# Patient Record
Sex: MALE | Race: WHITE | NOT HISPANIC OR LATINO | URBAN - METROPOLITAN AREA
[De-identification: names, ages, dates, MRNs, and addresses within clinical notes are randomized per-mention and may not be internally consistent; named-entity substitution may affect disease eponyms.]

---

## 2017-02-16 ENCOUNTER — HISTORICAL (OUTPATIENT)
Dept: ADMINISTRATIVE | Facility: HOSPITAL | Age: 62
End: 2017-02-16

## 2017-12-18 LAB — CRC RECOMMENDATION EXT: NORMAL

## 2019-03-07 ENCOUNTER — HISTORICAL (OUTPATIENT)
Dept: ADMINISTRATIVE | Facility: HOSPITAL | Age: 64
End: 2019-03-07

## 2019-12-20 ENCOUNTER — HISTORICAL (OUTPATIENT)
Dept: ADMINISTRATIVE | Facility: HOSPITAL | Age: 64
End: 2019-12-20

## 2020-05-21 ENCOUNTER — HISTORICAL (OUTPATIENT)
Dept: ADMINISTRATIVE | Facility: HOSPITAL | Age: 65
End: 2020-05-21

## 2020-05-25 ENCOUNTER — HISTORICAL (OUTPATIENT)
Dept: RADIOLOGY | Facility: HOSPITAL | Age: 65
End: 2020-05-25

## 2020-06-18 ENCOUNTER — HISTORICAL (OUTPATIENT)
Dept: ADMINISTRATIVE | Facility: HOSPITAL | Age: 65
End: 2020-06-18

## 2020-06-18 LAB
ABS NEUT (OLG): 6.87 X10(3)/MCL (ref 2.1–9.2)
ALBUMIN SERPL-MCNC: 2.9 GM/DL (ref 3.4–4.8)
ALBUMIN/GLOB SERPL: 0.9 RATIO (ref 1.1–2)
ALP SERPL-CCNC: 53 UNIT/L (ref 40–150)
ALT SERPL-CCNC: 10 UNIT/L (ref 0–55)
AST SERPL-CCNC: 16 UNIT/L (ref 5–34)
BASOPHILS # BLD AUTO: 0.06 X10(3)/MCL (ref 0–0.2)
BASOPHILS NFR BLD AUTO: 0.6 % (ref 0–0.9)
BILIRUB SERPL-MCNC: 0.8 MG/DL (ref 0.2–1.2)
BILIRUBIN DIRECT+TOT PNL SERPL-MCNC: 0.4 MG/DL (ref 0–0.5)
BILIRUBIN DIRECT+TOT PNL SERPL-MCNC: 0.4 MG/DL (ref 0–0.8)
BNP BLD-MCNC: 65.5 PG/ML
BUN SERPL-MCNC: 12.3 MG/DL (ref 8.4–25.7)
CALCIUM SERPL-MCNC: 8.7 MG/DL (ref 8.8–10)
CHLORIDE SERPL-SCNC: 104 MMOL/L (ref 98–107)
CHOLEST SERPL-MCNC: 103 MG/DL
CHOLEST/HDLC SERPL: 3 {RATIO} (ref 0–5)
CO2 SERPL-SCNC: 23 MMOL/L (ref 23–31)
CREAT SERPL-MCNC: 0.73 MG/DL (ref 0.72–1.25)
EOSINOPHIL # BLD AUTO: 0.19 X10(3)/MCL (ref 0–0.9)
EOSINOPHIL NFR BLD AUTO: 1.8 % (ref 0–6.5)
ERYTHROCYTE [DISTWIDTH] IN BLOOD BY AUTOMATED COUNT: 14.4 % (ref 11.5–17)
EST. AVERAGE GLUCOSE BLD GHB EST-MCNC: 111.2 MG/DL
GLOBULIN SER-MCNC: 3.2 GM/DL (ref 2.4–3.5)
GLUCOSE SERPL-MCNC: 99 MG/DL (ref 82–115)
HBA1C MFR BLD: 5.5 %
HCT VFR BLD AUTO: 33.9 % (ref 42–52)
HDLC SERPL-MCNC: 39 MG/DL (ref 40–60)
HGB BLD-MCNC: 11.4 GM/DL (ref 14–18)
IMM GRANULOCYTES # BLD AUTO: 0.03 10*3/UL (ref 0–0.02)
IMM GRANULOCYTES NFR BLD AUTO: 0.3 % (ref 0–0.43)
LDLC SERPL CALC-MCNC: 46 MG/DL (ref 50–140)
LYMPHOCYTES # BLD AUTO: 2.36 X10(3)/MCL (ref 0.6–4.6)
LYMPHOCYTES NFR BLD AUTO: 22.5 % (ref 16.2–38.3)
MCH RBC QN AUTO: 30.8 PG (ref 27–31)
MCHC RBC AUTO-ENTMCNC: 33.6 GM/DL (ref 33–36)
MCV RBC AUTO: 91.6 FL (ref 80–94)
MONOCYTES # BLD AUTO: 0.96 X10(3)/MCL (ref 0.1–1.3)
MONOCYTES NFR BLD AUTO: 9.2 % (ref 4.7–11.3)
NEUTROPHILS # BLD AUTO: 6.87 X10(3)/MCL (ref 2.1–9.2)
NEUTROPHILS NFR BLD AUTO: 65.6 % (ref 49.1–73.4)
NRBC BLD AUTO-RTO: 0 % (ref 0–0.2)
PLATELET # BLD AUTO: 216 X10(3)/MCL (ref 130–400)
PMV BLD AUTO: 10.6 FL (ref 7.4–10.4)
POTASSIUM SERPL-SCNC: 3.8 MMOL/L (ref 3.5–5.1)
PREALB SERPL-MCNC: 12.6 MG/DL (ref 16–42)
PROT SERPL-MCNC: 6.1 GM/DL (ref 5.8–7.6)
RBC # BLD AUTO: 3.7 X10(6)/MCL (ref 4.7–6.1)
SODIUM SERPL-SCNC: 139 MMOL/L (ref 136–145)
TRIGL SERPL-MCNC: 91 MG/DL (ref 0–150)
TSH SERPL-ACNC: 4.84 UIU/ML (ref 0.35–4.94)
VLDLC SERPL CALC-MCNC: 18 MG/DL
WBC # SPEC AUTO: 10.5 X10(3)/MCL (ref 4.5–11.5)

## 2020-06-22 LAB
ABS NEUT (OLG): 5.77 X10(3)/MCL (ref 2.1–9.2)
ALBUMIN SERPL-MCNC: 2.6 GM/DL (ref 3.4–4.8)
ALBUMIN/GLOB SERPL: 0.8 RATIO (ref 1.1–2)
ALP SERPL-CCNC: 54 UNIT/L (ref 40–150)
ALT SERPL-CCNC: 36 UNIT/L (ref 0–55)
AST SERPL-CCNC: 32 UNIT/L (ref 5–34)
BASOPHILS # BLD AUTO: 0.05 X10(3)/MCL (ref 0–0.2)
BASOPHILS NFR BLD AUTO: 0.6 % (ref 0–0.9)
BILIRUB SERPL-MCNC: 0.7 MG/DL (ref 0.2–1.2)
BILIRUBIN DIRECT+TOT PNL SERPL-MCNC: 0.3 MG/DL (ref 0–0.8)
BILIRUBIN DIRECT+TOT PNL SERPL-MCNC: 0.4 MG/DL (ref 0–0.5)
BUN SERPL-MCNC: 14.2 MG/DL (ref 8.4–25.7)
CALCIUM SERPL-MCNC: 8.6 MG/DL (ref 8.8–10)
CHLORIDE SERPL-SCNC: 105 MMOL/L (ref 98–107)
CO2 SERPL-SCNC: 23 MMOL/L (ref 23–31)
CREAT SERPL-MCNC: 0.62 MG/DL (ref 0.72–1.25)
EOSINOPHIL # BLD AUTO: 0.24 X10(3)/MCL (ref 0–0.9)
EOSINOPHIL NFR BLD AUTO: 2.8 % (ref 0–6.5)
ERYTHROCYTE [DISTWIDTH] IN BLOOD BY AUTOMATED COUNT: 14.3 % (ref 11.5–17)
GLOBULIN SER-MCNC: 3.3 GM/DL (ref 2.4–3.5)
GLUCOSE SERPL-MCNC: 110 MG/DL (ref 82–115)
HCT VFR BLD AUTO: 31.8 % (ref 42–52)
HGB BLD-MCNC: 10.5 GM/DL (ref 14–18)
IMM GRANULOCYTES # BLD AUTO: 0.06 10*3/UL (ref 0–0.02)
IMM GRANULOCYTES NFR BLD AUTO: 0.7 % (ref 0–0.43)
LYMPHOCYTES # BLD AUTO: 1.76 X10(3)/MCL (ref 0.6–4.6)
LYMPHOCYTES NFR BLD AUTO: 20.3 % (ref 16.2–38.3)
MCH RBC QN AUTO: 30.9 PG (ref 27–31)
MCHC RBC AUTO-ENTMCNC: 33 GM/DL (ref 33–36)
MCV RBC AUTO: 93.5 FL (ref 80–94)
MONOCYTES # BLD AUTO: 0.8 X10(3)/MCL (ref 0.1–1.3)
MONOCYTES NFR BLD AUTO: 9.2 % (ref 4.7–11.3)
NEUTROPHILS # BLD AUTO: 5.77 X10(3)/MCL (ref 2.1–9.2)
NEUTROPHILS NFR BLD AUTO: 66.4 % (ref 49.1–73.4)
NRBC BLD AUTO-RTO: 0 % (ref 0–0.2)
PLATELET # BLD AUTO: 287 X10(3)/MCL (ref 130–400)
PMV BLD AUTO: 10.3 FL (ref 7.4–10.4)
POTASSIUM SERPL-SCNC: 4.2 MMOL/L (ref 3.5–5.1)
PREALB SERPL-MCNC: 14.5 MG/DL (ref 16–42)
PROT SERPL-MCNC: 5.9 GM/DL (ref 5.8–7.6)
RBC # BLD AUTO: 3.4 X10(6)/MCL (ref 4.7–6.1)
SODIUM SERPL-SCNC: 139 MMOL/L (ref 136–145)
WBC # SPEC AUTO: 8.7 X10(3)/MCL (ref 4.5–11.5)

## 2020-07-09 ENCOUNTER — HISTORICAL (OUTPATIENT)
Dept: ADMINISTRATIVE | Facility: HOSPITAL | Age: 65
End: 2020-07-09

## 2021-09-07 ENCOUNTER — HISTORICAL (OUTPATIENT)
Dept: BARIATRICS | Facility: HOSPITAL | Age: 66
End: 2021-09-07

## 2021-10-05 ENCOUNTER — HISTORICAL (OUTPATIENT)
Dept: BARIATRICS | Facility: HOSPITAL | Age: 66
End: 2021-10-05

## 2021-11-02 ENCOUNTER — HISTORICAL (OUTPATIENT)
Dept: BARIATRICS | Facility: HOSPITAL | Age: 66
End: 2021-11-02

## 2021-11-30 ENCOUNTER — HISTORICAL (OUTPATIENT)
Dept: BARIATRICS | Facility: HOSPITAL | Age: 66
End: 2021-11-30

## 2021-12-29 ENCOUNTER — HISTORICAL (OUTPATIENT)
Dept: ADMINISTRATIVE | Facility: HOSPITAL | Age: 66
End: 2021-12-29

## 2022-01-04 ENCOUNTER — HISTORICAL (OUTPATIENT)
Dept: BARIATRICS | Facility: HOSPITAL | Age: 67
End: 2022-01-04

## 2022-02-01 ENCOUNTER — HISTORICAL (OUTPATIENT)
Dept: BARIATRICS | Facility: HOSPITAL | Age: 67
End: 2022-02-01

## 2022-03-17 ENCOUNTER — PATIENT OUTREACH (OUTPATIENT)
Dept: ADMINISTRATIVE | Facility: HOSPITAL | Age: 67
End: 2022-03-17

## 2022-03-17 NOTE — PROGRESS NOTES
[facility] CMS chart audits. Chart review completed for  test overdue (mammograms, Colonoscopies, pap smears, DM labs, and/or EYE EXAMs)      Care Everywhere and media, updates requested and reviewed.      HM updated with external COLONOSCOPY report.

## 2022-04-10 ENCOUNTER — HISTORICAL (OUTPATIENT)
Dept: ADMINISTRATIVE | Facility: HOSPITAL | Age: 67
End: 2022-04-10
Payer: MEDICARE

## 2022-04-26 ENCOUNTER — HISTORICAL (OUTPATIENT)
Dept: BARIATRICS | Facility: HOSPITAL | Age: 67
End: 2022-04-26
Payer: MEDICARE

## 2022-04-26 VITALS
DIASTOLIC BLOOD PRESSURE: 70 MMHG | WEIGHT: 238 LBS | HEIGHT: 73 IN | BODY MASS INDEX: 31.54 KG/M2 | SYSTOLIC BLOOD PRESSURE: 112 MMHG | OXYGEN SATURATION: 99 %

## 2022-05-02 NOTE — HISTORICAL [facility] CERNER
This is a historical note converted from [employer]. Formatting and pictures may have been removed.  Please reference [employer]gelia for original formatting and attached multimedia. Chief Complaint  PRE-OP TODAY FOR B/L TKA SX ON 6/15/20, HERE TODAY FOR X-RAYS.  History of Present Illness  bilateral knee pain  would like to schedule TKA as conservative measures fail to give relief  ?  having right groin pain  his ROM has decreased  Review of Systems  Review of Systems?  ?????Constitutional: ?No fever, No chills. ?  ?????Respiratory: ?No shortness of breath, No cough. ?  ?????Cardiovascular: ?No chest pain. ?  ?????Gastrointestinal: ?No nausea, No vomiting, No diarrhea, No constipation, No heartburn. ?  ?????Genitourinary: ?No dysuria, No hematuria. ?  ?????Hematology/Lymphatics: ?No bleeding tendency. ?  ?????Endocrine: ?No polyuria. ?  ?????Neurologic: ?Alert and oriented X4, No numbness, No tingling. ?  ?????Psychiatric: ?No anxiety, No depression. ?  ?????Integumentary: no abnormalities except as noted in history of present illness  Physical Exam  Vitals & Measurements  T:?98.0? ?F (Oral)? BP:?119/78?  HT:?185?cm? WT:?113.30?kg? BMI:?33.1?  Musculoskeletal System:  Pelvis:  General/bilateral:  NO?? Tenderness on palpation of the iliac crest.  No ?? Tenderness on palpation of the anterior superior iliac spine.  No ?? Tenderness on palpation of the posterior superior iliac spine.  No ?? Pubic symphysis showed tenderness on palpation.  No ?? Tenderness on palpation of the sacroiliac joint. ?? No tenderness on palpation of the pelvic girdle.  Hips:  Hip: ?? No swelling.  ?? Greater trochanter did not show tenderness on palpation.  ?? Hip was not tender on palpation.  ?? Motion was abnormal.  ??? pain was elicited by active internal rotation with the hip flexed.  ??? pain was elicited by active external rotation with the hip flexed.  ??? pain was elicited by active motion.  ??? pain was elicited by passive motion.  ?? Hip was not  dislocated.  ?? No instability was noted.  Neurological:  Motor (Strength): ?? No weakness of the hip was observed.  Gait And Stance: ?? No limping was noted. ?? No antalgic gait was observed.  Skin:  ?? No scars hip.  ???  Cardiovascular:  Arterial Pulses: ?? Dorsalis pedis pulses were normal  ?   x-rays taken today  maintained joint spaces  cystic changes of the femoral head  osteophytes noted soft tissues  ?   diagnosis  possible AVN  ?   plan  MRI right hip  ?  ?  bilateral?knee exam  Tenderness to palpation of the?medial and patellofemoral?joint line  small?effusion to the knee  Crepitus with ROM  ROM 5-115 right knee  ????????  left knee  Varus alignment of the knee  Good pulses to the feet  ?   x-rays?taken today  severe arthritic changes with osteophytes  varus deformity  extra-articular osteophytes  ?   plan  bilateral TKA  Administered corticosteroid injection?? ? 2cc cortisone and 2cc lidocaine using sterile technique after informed verbal consent. Risks discussed with patient prior to injection. Informed the patient of the following:  -?Explained to patient that this injection in some patients will experience increased pain a few minutes after the injection and that the pain will last anywhere from 10 to 20 minutes. In order to decrease the pain you need to do the following:  ?Make sure to move the extremity in which the injection was given. If you do not move the medication sits there and can cause more pain.  Ice the affected joint every 2 hours for 20 minutes at a time for the next 24 hours.  ?If you are not already taking an anti-inflammatory take the following Ibuprofen/ Advil take 3 to 4 tablets every 6 to 8 hours or 2 Aleve every 12 hours for the next 5 days to help the medication work. If you cannot take anti-inflammatory take 2 extra strength Tylenol every 6 hours for the next 5 days.  Patient voiced understanding  ?????????????????????????????????????????????????????????????????????????????  Assessment/Plan  1.?Primary osteoarthritis of left knee?M17.12  2.?Primary osteoarthritis of right knee?M17.11  Referrals  Clinic Follow up, 05/21/20 9:39:00 CDT, prn, Avascular necrosis of bone of right hip, [facility]   Problem List/Past Medical History  Ongoing  High cholesterol  HTN - Hypertension  Obesity  Primary osteoarthritis of left knee  Primary osteoarthritis of right knee  Primary osteoarthritis of right knee  Tobacco user  Historical  No qualifying data  Procedure/Surgical History  Arthroscopy of knee with medial meniscus repair   Medications  Aspir 81 oral delayed release tablet, 81 mg= 1 tab(s), Oral, Daily  AZITHROMYCIN 250 MG TABLET, Oral, As Directed,? ?Not taking: Last Dose Date/Time Unknown  CHLORHEXIDINE 0.12% RINSE,? ?Not taking: Last Dose Date/Time Unknown  HYDROCODON-ACETAMINOPH 7.5-325, Oral, q4hr,? ?Not taking: Last Dose Date/Time Unknown  HYDROMET (HYDROCOD/ELDER)5/1.5MG SYR, Oral, QID,? ?Not taking: Last Dose Date/Time Unknown  IBUPROFEN 800 MG TABLET, Oral,? ?Not taking: Last Dose Date/Time Unknown  pravastatin 40 mg oral tablet, 40 mg= 1 tab(s), Oral, qPM  Simply Sleep 25 mg oral tablet, 25 mg= 1 tab(s), Oral, Once a day (at bedtime), PRN  TAMSULOSIN 0.4MG CAPSULES, 0.4 mg= 1 cap(s), Oral, Daily  Zantac, 1 tab(s), Oral, Daily  Allergies  No Known Allergies  Social History  Abuse/Neglect  No, 12/20/2019  Alcohol  Current, Beer, Wine, 1-2 times per month, 02/16/2017  Employment/School  Employed, 02/16/2017  Home/Environment  Lives with Spouse. Living situation: Home/Independent., 02/16/2017  Substance Use  Never, 02/16/2017  Tobacco  Former smoker, quit more than 30 days ago, No, 12/20/2019  Family History  Family history is unknown  Health Maintenance  Health Maintenance  ???Pending?(in the next year)  ??? ??OverDue  ??? ? ? ?Aspirin Therapy for CVD Prevention due??02/16/18??and every 1??year(s)  ??? ? ?  ?Advance Directive due??01/01/20??and every 1??year(s)  ??? ??Due?  ??? ? ? ?ADL Screening due??05/26/20??and every 1??year(s)  ??? ? ? ?Abdominal Aortic Aneurysm Screening due??05/26/20??and every 100??year(s)  ??? ? ? ?Colorectal Screening (Senior Wellness) due??05/26/20??and every?  ??? ? ? ?Hypertension Management-Education due??05/26/20??and every 1??year(s)  ??? ? ? ?Lung Cancer Screening due??05/26/20??and every 1??year(s)  ??? ? ? ?Medicare Annual Wellness Exam due??05/26/20??and every 1??year(s)  ??? ? ? ?Pneumococcal Vaccine due??05/26/20??Variable frequency  ??? ? ? ?Pneumococcal Vaccine due??05/26/20??and every?  ??? ? ? ?Tetanus Vaccine due??05/26/20??and every 10??year(s)  ??? ? ? ?Zoster Vaccine due??05/26/20??and every 100??year(s)  ??? ??Due In Future?  ??? ? ? ?Alcohol Misuse Screening not due until??01/01/21??and every 1??year(s)  ??? ? ? ?Cognitive Screening not due until??01/01/21??and every 1??year(s)  ??? ? ? ?Fall Risk Assessment not due until??01/01/21??and every 1??year(s)  ??? ? ? ?Functional Assessment not due until??01/01/21??and every 1??year(s)  ??? ? ? ?Obesity Screening not due until??01/01/21??and every 1??year(s)  ??? ? ? ?Smoking Cessation not due until??01/01/21??and every 1??year(s)  ??? ? ? ?Hypertension Management-Blood Pressure not due until??05/21/21??and every 1??year(s)  ??? ? ? ?Hypertension Management-BMP not due until??05/21/21??and every 1??year(s)  ???Satisfied?(in the past 1 year)  ??? ??Satisfied?  ??? ? ? ?Alcohol Misuse Screening on??05/21/20.??Satisfied by Bonifacio Marcus  ??? ? ? ?Blood Pressure Screening on??05/21/20.??Satisfied by Bonifacio Marcus  ??? ? ? ?Body Mass Index Check on??05/21/20.??Satisfied by Dorina Esquivel RN  ??? ? ? ?Diabetes Screening on??05/21/20.??Satisfied by Michael Lau  ??? ? ? ?Hypertension Management-BMP on??05/21/20.??Satisfied by Michael Lau  ??? ? ? ?Obesity Screening on??05/21/20.??Satisfied by Dorina Esquivel RN  ??? ? ?  ?Smoking Cessation on??05/21/20.??Satisfied by Bonifacio Marcus  ?

## 2022-05-02 NOTE — HISTORICAL [facility] CERNER
This is a historical note converted from [employer]gelia. Formatting and pictures may have been removed.  Please reference [employer]gelia for original formatting and attached multimedia. Chief Complaint  PT HERE FOR 3RD BILATERAL KNEE SYNVISC INJ, PT ALSO C/O RIGHT HIP PAIN, X-RAY TODAY ...CV  History of Present Illness  3rdSynvisc bilateral knees  ?  right groin pain and wants to have a hip x-ray  States he still having some mild groin pain but it is substantially better since his last?appointment a week and half ago  Review of Systems  Constitutional: No fever, No chills.  Respiratory: No shortness of breath, No cough.  Cardiovascular: No chest pain.  Gastrointestinal: No nausea, No vomiting, No diarrhea, No constipation, No heartburn.  Genitourinary: No dysuria, No hematuria.  Hematology/Lymphatics: No bleeding tendency.  Endocrine: No polyuria.  Neurologic: Alert and oriented X4, No numbness, No tingling.  Psychiatric: No anxiety, No depression.  Integumentary: ?negative except as documented in history of present [id number] Synvsic  Physical Exam  Vitals & Measurements  HR:?82(Peripheral)? BP:?121/78?  HT:?185?cm? WT:?103.41?kg? BMI:?30.21?  3rd Synvisc bilateral knees  Using sterile technique after informed verbal consent. Risks discussed with patient prior to injection. Informed him the following instruction to follow: ??  If you are not already on anti-inflammatory do the following 3 to 4 tablets of Ibuprofen every 6 to 8 hours or 2 Aleve every 12 hours. If unable to take anti-inflammatory take 2 extra strength Tylenol every 8 hours for the next 5 days.?  Apply ice to the affected area 3 to 4 times a day for the next 5 days.?  Make sure and move your knee this is the best way to get the medication where it should be, riding a bike or a stationary bike is also helpful.?  Patient voiced understanding.  ?   Hip Exam:?Right  No obvious deformity.  Flexion to 120 degrees and internal and external rotation to 40 degrees.  Abduction  to 45 degree and adduction to 30 degrees.  Negative ELVI test.  No flexion contracture.  Negative greater trochanteric tenderness.  5/5 strength, normal skin appearance and palpable pulses distally.  Sensibility normal  ?   X-rays of the right hip and AP pelvis taken today  There is some mild arthritic changes with?flattening of the hip and some mild osteophytes but well-maintained joint space  ?   Patient is want to have bilateral total knee arthroplasties in early June.  He will follow-up?and speak with Dr. Carver about bilateral total knee replacements in April and we will plan on surgery in early June  Assessment/Plan  1.?Primary osteoarthritis of left knee?M17.12  2.?Primary osteoarthritis of right knee?M17.11  Orders:  XR Hip Right 2 Views, Routine, 12/20/19 8:00:00 CST, Pain, None, Ambulatory, Rad Type, Right hip pain, 12/20/19 8:00:00 CST   Problem List/Past Medical History  Ongoing  High cholesterol  Obesity  Primary osteoarthritis of left knee  Primary osteoarthritis of right knee  Primary osteoarthritis of right knee  Tobacco user  Historical  No qualifying data  Procedure/Surgical History  Arthroscopy of knee with medial meniscus repair   Medications  AMOXICILLIN 500 MG TABLET, 500 mg= 1 tab(s), Oral, TID,? ?Not taking  Aspir 81 oral delayed release tablet, 81 mg= 1 tab(s), Oral, Daily  AZITHROMYCIN 250 MG TABLET, Oral, As Directed,? ?Not taking  CHLORHEXIDINE 0.12% RINSE,? ?Not taking  HYDROCODON-ACETAMINOPH 7.5-325, Oral, q4hr,? ?Not taking  HYDROMET (HYDROCOD/ELDER)5/1.5MG SYR, Oral, QID,? ?Not taking  IBUPROFEN 800 MG TABLET, Oral  pravastatin, Oral, Daily  TAMSULOSIN 0.4MG CAPSULES, 0.4 mg= 1 cap(s), Oral, Daily  Zantac,? ?Not taking: Last Dose Date/Time Unknown. done  Allergies  No Known Allergies  Social History  Abuse/Neglect  No, 12/20/2019  Alcohol  Current, Beer, Wine, 1-2 times per month, 02/16/2017  Employment/School  Employed, 02/16/2017  Home/Environment  Lives with Spouse. Living  situation: Home/Independent., 02/16/2017  Substance Use  Never, 02/16/2017  Tobacco  Former smoker, quit more than 30 days ago, No, 12/20/2019  Family History  Family history is unknown  Health Maintenance  Health Maintenance  ???Pending?(in the next year)  ??? ??OverDue  ??? ? ? ?Aspirin Therapy for CVD Prevention due??02/16/18??and every 1??year(s)  ??? ? ? ?Smoking Cessation due??01/01/19??and every 1??year(s)  ??? ? ? ?Depression Screening due??03/08/19??and every 1??year(s)  ??? ??Due?  ??? ? ? ?ADL Screening due??12/20/19??and every 1??year(s)  ??? ? ? ?Colorectal Screening due??12/20/19??and every?  ??? ? ? ?Diabetes Screening due??12/20/19??and every?  ??? ? ? ?Influenza Vaccine due??12/20/19??and every?  ??? ? ? ?Lung Cancer Screening due??12/20/19??and every 1??year(s)  ??? ? ? ?Tetanus Vaccine due??12/20/19??and every 10??year(s)  ??? ? ? ?Zoster Vaccine due??12/20/19??and every 100??year(s)  ??? ??Due In Future?  ??? ? ? ?Alcohol Misuse Screening not due until??01/01/20??and every 1??year(s)  ??? ? ? ?Obesity Screening not due until??01/01/20??and every 1??year(s)  ??? ? ? ?Blood Pressure Screening not due until??12/19/20??and every 1??year(s)  ??? ? ? ?Body Mass Index Check not due until??12/19/20??and every 1??year(s)  ???Satisfied?(in the past 1 year)  ??? ??Satisfied?  ??? ? ? ?Alcohol Misuse Screening on??12/20/19.??Satisfied by Bonita Lopes LPN  ??? ? ? ?Blood Pressure Screening on??12/20/19.??Satisfied by Bonita Lopes LPN  ??? ? ? ?Body Mass Index Check on??12/20/19.??Satisfied by Bonita Lopes LPN  ??? ? ? ?Obesity Screening on??12/20/19.??Satisfied by Bonita Lopes LPN  ?      Diagnosis:?Right hip mild osteoarthritis

## 2022-05-02 NOTE — HISTORICAL [facility] CERNER
This is a historical note converted from Certay. Formatting and pictures may have been removed.  Please reference Argelia for original formatting and attached multimedia. Reason for Consultation  Physiatry  History of Present Illness  Admit MD: Nicola Richardson MD  Consult Physiatry: Edward Arciniega MD  HPI: 65-year-old white male presented to [facility] for scheduled bilateral TKA on 6/15/2020. ?PMH bilateral knee osteoarthritis, CAD, BPH, and HLD. ?Tolerated bilateral TKA on 6/15 without perioperative complications. ?Tolerated 2 units PRBC on 6/16. ?H&H remained stable. ?Tolerated transfer to [facility] inpatient rehab unit on 6/17 without incident.??  6/18: Seen in patient room, seated in recliner with BLE elevated and ice packs on knees. Reports feeling good and tolerating therapy well. Pain controlled with medication. Tells me he has already gotten to 0 degrees extension? and 90 degrees flexion actively on the right, with the left just slightly behind. Reports good sleep up until last night, where he found himself waking every couple hours but not due to pain. Reports good appetite and bowels moving yesterday following suppository. Tells me he has had some urinary retention and goetz catheter was removed this morning with some medication initiated to?assist. He remains hopeful. Encourage fluids. ?Denies complaints. Motivated. VSSAF with noted episode of tachycardia this afternoon. Monitor.  Review of Systems  Comprehensive Review of Systems performed with no exceptions other than as noted in HPI.  Barriers to Discharge:  Social:Patient completed bachelors degree. 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 Denies history of  involvement.?  Wife does not work, drives, typically completes the cooking, cleaning, and laundry. ?*She cannot physically assist  Medical:??Bilateral knee osteoarthritis s/p bilateral TKA,?CAD,?Anemia,?HLD,?BPH?  Functional:  Prior Level of Fx: ?independent ADLs, drives,  , manages finances, manages own meds, hires someone for lawn maintenance; does not have medic alert  Residence: ?Patient lives with spouse in [place]. ?They live in a multi-story home with 1 step to gain entry-20 steps inside. *does not need to access second story?  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 Denies having any DME.?  Diet: ? Regular Cardiac?  Psychiatric:?Hx mental health/substance abuse: ?Denies history of mental health problems / Current smoker and occasional drinker  ?  ?  Physical Exam  Vitals & Measurements  T:?36.7? ?C (Oral)? TMIN:?36.7? ?C (Oral)? TMAX:?37.4? ?C (Oral)? HR:?104(Peripheral)? RR:?18? BP:?101/66? SpO2:?97%? WT:?119.2?kg? BMI:?34.83?  General:?well-developed, well-nourished, in no acute distress  Eye: EOMI, clear conjunctiva, eyelids normal  HENT:?normocephalic, ?oropharynx and nasal mucosal surfaces moist  Neck: full range of motion, supple  Respiratory:?clear to auscultation bilaterally  Cardiovascular:?regular rate and rhythm without murmurs, BLE trace edema  Gastrointestinal:?soft, non-tender, non-distended with normal bowel sounds, without masses to palpation  Musculoskeletal:?decreased ROM/strength to BLE  Integumentary: no rashes or skin lesions present, bilateral knee incisions-staples, dressings-c/d/i  Neurologic: cranial nerves intact, no signs of peripheral neurological deficit, motor/sensory function intact  ?  Assessment/Plan  1.?Osteoarthritis of knees, bilateral?M17.0  2.?Status post total knee replacement?Z96.659  3.?CAD (coronary artery disease)?I25.10  4.?Anemia?D64.9  5.?High cholesterol?E78.00  6.?Obesity?E66.9  7.?Urinary retention?R33.9  8.?Constipation?K59.00  9.?GERD (gastroesophageal reflux disease)?K21.9  10.?Tobacco user?Z72.0  Pain:?bilateral knees-controlled  acetaminophen (Tylenol)500 mg, form: Tab, Oral, q4hr  methocarbamol (Robaxin)500 mg, form: Tab, Oral, TID  traMADol  (Ultram)50 mg, form: Tab, Oral, q4hr PRN for pain  Wounds:?bilateral knee incisions-staples, dressings-c/d/i  s/p bilateral TKA on 6/15/2020 (Shahla)  Precautions:?fall risk??  Bracing: none  Bowels/Bladder: last BM 6/17 following suppository? ?  Swallowing:?Regular Diet with PROSource BID??  Sleep:?intermittent waking  temazepam (Restoril)15 mg, form: Cap, Oral, Once a day (at bedtime  Depression/Anxiety: denies  Function: Tolerating therapy. Continue PT/OT/RT  VTE Prophylaxis: TEDs?  Code Status:?FULL CODE ?  Discharge:? Patient lives with spouse in [place]. ?They live in a multi-story home with 1 step to gain entry-20 steps inside. *does not need to access second story. ?Wife does not work, drives, typically completes the cooking, cleaning, and laundry. ?*She cannot physically assist. Date pending.?  ?   Problem List/Past Medical History  Ongoing  High cholesterol  HTN - Hypertension  Obesity  Osteoarthritis of knees, bilateral  Primary osteoarthritis of left knee  Primary osteoarthritis of right knee  Primary osteoarthritis of right knee  Tobacco user  Historical  No qualifying data  Procedure/Surgical History  Total Knee Arthroplasty Bilateral (Bilateral) (06/15/2020)  Arthroscopy of knee with medial meniscus repair   Medications  Inpatient  ascorbic acid 500 mg oral tablet, 500 mg= 1 tab(s), Oral, Daily  aspirin 81 mg oral Delayed Release (EC) tablet, 81 mg= 1 tab(s), Oral, BID  Colace 100 mg oral capsule, 100 mg= 1 cap(s), Oral, BID  Dulcolax Laxative 10 mg RECTAL suppository, 10 mg= 1 supp, KS (rectal), q3day, PRN  Flomax 0.4 mg oral capsule, 0.4 mg= 1 cap(s), Oral, BID  lactulose 10 g/15 mL oral syrup, 20 gm= 30 mL, Oral, Every other day, PRN  Pepcid 20 mg oral tablet, 20 mg= 1 tab(s), Oral, Daily  Proscar 5 mg oral tablet, 5 mg= 1 tab(s), Oral, Daily  Restoril, 15 mg= 1 cap(s), Oral, Once a day (at bedtime)  Robaxin, 500 mg= 1 tab(s), Oral, TID  Robaxin 750 mg oral tablet, 750 mg= 1 tab(s), Oral, q8hr,  PRN  simvastatin 10 mg oral tablet, 20 mg= 2 tab(s), Oral, Daily  Tums 500, 500 mg= 1 tab(s), Chewed, TID, PRN  Tylenol, 500 mg= 1 tab(s), Oral, q4hr  Ultram, 50 mg= 1 tab(s), Oral, q4hr, PRN  Home  aspirin 81 mg oral Delayed Release (EC) tablet, 81 mg= 1 tab(s), Oral, BID  ketorolac, 10 mg, Oral, q8hr  pravastatin 40 mg oral tablet, 40 mg= 1 tab(s), Oral, qPM  Robaxin 750 mg oral tablet, 750 mg= 1 tab(s), Oral, q8hr, PRN  TAMSULOSIN 0.4MG CAPSULES, 0.4 mg= 1 cap(s), Oral, Daily  Tums 500, 500 mg= 1 tab(s), Chewed, TID, PRN  Tylenol, 500 mg= 1 tab(s), Oral, q4hr  Ultram 50 mg oral tablet, 50 mg= 1 tab(s), Oral, q4hr, PRN  Zantac, 1 tab(s), Oral, Daily  Allergies  No Known Allergies  Social History  Abuse/Neglect  No, 06/17/2020  No, 06/17/2020  No, 06/15/2020  Alcohol  Current, Beer, Wine, 1-2 times per month, 02/16/2017  Employment/School  Employed, 02/16/2017  Home/Environment  Lives with Spouse. Living situation: Home/Independent., 02/16/2017  Substance Use  Never, 02/16/2017  Tobacco  Former smoker, quit more than 30 days ago, No, 06/17/2020  Former smoker, quit more than 30 days ago, No, 06/15/2020  Family History  Family history is unknown  Lab Results  Test Name Test Result Date/Time   Sodium Lvl 139 mmol/L 06/18/2020 05:45 CDT   Potassium Lvl 3.8 mmol/L 06/18/2020 05:45 CDT   Chloride 104 mmol/L 06/18/2020 05:45 CDT   CO2 23 mmol/L 06/18/2020 05:45 CDT   Calcium Lvl 8.7 mg/dL (Low) 06/18/2020 05:45 CDT   Glucose Lvl 99 mg/dL 06/18/2020 05:45 CDT   .2 mg/dL 06/18/2020 05:45 CDT   BUN 12.3 mg/dL 06/18/2020 05:45 CDT   Creatinine 0.73 mg/dL 06/18/2020 05:45 CDT   eGFR-AA >60 06/18/2020 05:45 CDT   eGFR-LOLIS >60 06/18/2020 05:45 CDT   Bili Total 0.8 mg/dL 06/18/2020 05:45 CDT   Bili Direct 0.4 mg/dL 06/18/2020 05:45 CDT   Bili Indirect 0.40 mg/dL 06/18/2020 05:45 CDT   AST 16 unit/L 06/18/2020 05:45 CDT   ALT 10 unit/L 06/18/2020 05:45 CDT   Alk Phos 53 unit/L 06/18/2020 05:45 CDT   Total Protein 6.1 gm/dL  06/18/2020 05:45 CDT   Albumin Lvl 2.9 gm/dL (Low) 06/18/2020 05:45 CDT   Globulin 3.2 gm/dL 06/18/2020 05:45 CDT   A/G Ratio 0.9 ratio (Low) 06/18/2020 05:45 CDT   BNP 65.5 pg/mL 06/18/2020 05:45 CDT   Hgb A1c 5.5 % 06/18/2020 05:45 CDT   Prealbumin 12.6 mg/dL (Low) 06/18/2020 05:45 CDT   Chol 103 mg/dL 06/18/2020 05:45 CDT   HDL 39 mg/dL (Low) 06/18/2020 05:45 CDT   Trig 91 mg/dL 06/18/2020 05:45 CDT   LDL 46.00 mg/dL (Low) 06/18/2020 05:45 CDT   Chol/HDL 3 06/18/2020 05:45 CDT   VLDL 18 06/18/2020 05:45 CDT   TSH 4.8406 uIU/mL 06/18/2020 05:45 CDT   WBC 10.5 x10(3)/mcL 06/18/2020 05:45 CDT   RBC 3.70 x10(6)/mcL (Low) 06/18/2020 05:45 CDT   Hgb 11.4 gm/dL (Low) 06/18/2020 05:45 CDT   Hct 33.9 % (Low) 06/18/2020 05:45 CDT   Platelet 216 x10(3)/mcL 06/18/2020 05:45 CDT   MCV 91.6 fL 06/18/2020 05:45 CDT   MCH 30.8 pg 06/18/2020 05:45 CDT   MCHC 33.6 gm/dL 06/18/2020 05:45 CDT   RDW 14.4 % 06/18/2020 05:45 CDT   MPV 10.6 fL (High) 06/18/2020 05:45 CDT   Abs Neut 6.87 x10(3)/mcL 06/18/2020 05:45 CDT   Neutro Auto 65.6 % 06/18/2020 05:45 CDT   Lymph Auto 22.5 % 06/18/2020 05:45 CDT   Mono Auto 9.2 % 06/18/2020 05:45 CDT   Eos Auto 1.8 % 06/18/2020 05:45 CDT   Abs Eos 0.19 x10(3)/mcL 06/18/2020 05:45 CDT   Basophil Auto 0.6 % 06/18/2020 05:45 CDT   Abs Neutro 6.87 x10(3)/mcL 06/18/2020 05:45 CDT   Abs Lymph 2.36 x10(3)/mcL 06/18/2020 05:45 CDT   Abs Mono 0.96 x10(3)/mcL 06/18/2020 05:45 CDT   Abs Baso 0.06 x10(3)/[facility] 06/18/2020 05:45 CDT   NRBC% 0.0 % 06/18/2020 05:45 CDT   IG% 0.300 % 06/18/2020 05:45 CDT   IG# 0.0300 (High) 06/18/2020 05:45 CDT   Diagnostic Results  (05/21/2020 10:50 CDT XR Chest 2 Views)  Reason For Exam  pre op  ?  Radiology Report  XR Chest 2 Views  ?  REASON FOR STUDY: pre op  ?  TECHNIQUE: Frontal and lateral radiographs of the chest  ?  COMPARISON: 10/18/2010  ?  FINDINGS: Cardiac silhouette and mediastinal contours are within  normal limits. The lungs are well-inflated. No  consolidation  identified. There is no pleural effusion or discernible pneumothorax.  ?  IMPRESSION:?  ?  No acute cardiopulmonary process.  ??  Signature Line  Electronically Signed By: Manohar Verduzco DO  Date/Time Signed: 05/21/2020 11:08 [1]  ?  ?  (05/25/2020 13:56 CDT MRI Ext Lower Joint Right W/O Contrast)  Reason For Exam  Hip pain, arthritis or infection suspected, abnormal xray;Other (please specify)  ?  Radiology Report  Comparison: Radiographs right hip used for comparison dated 5/21/2020.  ?  INDICATION: Avascular necrosis of the right hip. Abnormal x-ray.  ?  TECHNIQUE: Unenhanced, multiplanar, multisequence MR imaging of the  pelvis and right hip was obtained.  ?  FINDINGS:  ?  Advanced osteoarthritic changes are present to the right hip and are  characterized by high-grade chondromalacia, bone on bone apposition,  and hypertrophic arthropathy. Femoral neck anatomy is normal.  Relatively mild osteoarthritic changes are present to the left hip. No  avascular necrosis, stress fracture, or insufficiency fracture is  present on todays study. No acute or chronic sacroiliitis. No  osteitis pubis.  ?  Gluteal tendons are intact. No trochanteric or iliopsoas bursitis.  Common hamstring origins are intact. Nodular hypertrophy of the  transitional zone of the prostate. The bladder appears to be within  normal limits. The included portion of the sciatic nerve is normal in  course and caliber. Uncomplicated colonic diverticulosis.  ?  IMPRESSION:  ?  Advanced, asymmetric, osteoarthritic changes are present to the right  hip and are characterized by bone on bone apposition and hypertrophic  change. No avascular necrosis or stress fracture seen to the right  femoral head or neck.  ?  Nodular hypertrophy of the transitional zone of the prostate. Please  correlate with PSA values.  ?  Signature Line  Electronically Signed By: Memo LINTON, Yann Pathak  Date/Time Signed: 05/25/2020 15:42 [2]     [1]?XR Chest 2  Views; Manohar Verduzco DO 05/21/2020 10:50 CDT  [2] MRI Ext Lower Joint Right W/O Contrast; Memo LINTON, Yann Pathak 05/25/2020 13:56 CDT

## 2022-05-02 NOTE — HISTORICAL [facility] CERNER
This is a historical note converted from Certay. Formatting and pictures may have been removed.  Please reference Certay for original formatting and attached multimedia. Chief Complaint  PT HERE FOR 2ND B/L SYNVISC INJECTIONS TODAY, NEW X-RAYS TAKEN...CV  History of Present Illness  2nd Synvisc?bilateral knees  ?  x-rays taken today  Review of Systems  Constitutional: No fever, No chills.  Respiratory: No shortness of breath, No cough.  Cardiovascular: No chest pain.  Gastrointestinal: No nausea, No vomiting, No diarrhea, No constipation, No heartburn.  Genitourinary: No dysuria, No hematuria.  Hematology/Lymphatics: No bleeding tendency.  Endocrine: No polyuria.  Neurologic: Alert and oriented X4, No numbness, No tingling.  Psychiatric: No anxiety, No depression.  Integumentary: ?negative except as documented in history of present illness  Physical Exam  Vitals & Measurements  BP:?113/74?  HT:?185?cm? WT:?103.41?kg? BMI:?30.21?  2nd Synvisc bilateral knees  Using sterile technique after informed verbal consent. Risks discussed with patient prior to injection. Informed him the following instruction to follow: ??  If you are not already on anti-inflammatory do the following 3 to 4 tablets of Ibuprofen every 6 to 8 hours or 2 Aleve every 12 hours. If unable to take anti-inflammatory take 2 extra strength Tylenol every 8 hours for the next 5 days.?  Apply ice to the affected area 3 to 4 times a day for the next 5 days.?  Make sure and move your knee this is the best way to get the medication where it should be, riding a bike or a stationary bike is also helpful.?  Patient voiced understanding.  ?   Patient had x-rays of bilateral knees today.  He has significant arthritic changes of bilateral knees left greater than right with bone-on-bone contact medial compartment?extra-articular osteophytes and sclerosis  Assessment/Plan  1.?Primary osteoarthritis of left knee?M17.12  Ordered:  hylan G-F 20, 4 mL, Intra-Articular,  Once, first dose 03/06/19 10:43:00 CST, stop date 03/06/19 10:43:00 CST  hylan G-F 20, 4 mL, Intra-Articular, Once, first dose 03/07/19 7:54:00 CST, stop date 03/07/19 7:54:00 CST  Lidocaine inj., 4 mL, Intra-Articular, Once, first dose 03/07/19 7:54:00 CST, stop date 03/07/19 7:54:00 CST  Lidocaine inj., 4 mL, Intra-Articular, Once, first dose 03/06/19 10:43:00 CST, stop date 03/06/19 10:43:00 CST  Asp/Inj (Bilateral) Major Jnt/Bursa [id number], 03/07/19 7:54:00 CST, [facility], Routine, 03/07/19 7:54:00 CST  Asp/Inj (Bilateral) Major Jnt/Bursa [id number], 03/06/19 10:43:00 CST, [facility], Routine, 03/06/19 10:43:00 CST  Clinic Follow up, *Est. 03/14/19 3:00:00 CDT, Order for future visit, Primary osteoarthritis of left knee  Primary osteoarthritis of right knee, Orthopaedics  Clinic Follow up, *Est. 03/13/19 3:00:00 CDT, Order for future visit, Osteoarthritis of right knee  Osteoarthritis of left knee, Orthopaedics  No Charge Per Visit PC, Primary osteoarthritis of left knee  Primary osteoarthritis of right knee, [facility]ics Clinic, 03/07/19 7:54:00 CST  Office/Outpatient Visit Level 3 Established 97867 PC, Osteoarthritis of right knee  Osteoarthritis of left knee, [facility], 03/06/19 10:43:00 CST  ?  2.?Primary osteoarthritis of right knee?M17.11  Ordered:  hylan G-F 20, 4 mL, Intra-Articular, Once, first dose 03/06/19 10:43:00 CST, stop date 03/06/19 10:43:00 CST  hylan G-F 20, 4 mL, Intra-Articular, Once, first dose 03/07/19 7:54:00 CST, stop date 03/07/19 7:54:00 CST  Lidocaine inj., 4 mL, Intra-Articular, Once, first dose 03/07/19 7:54:00 CST, stop date 03/07/19 7:54:00 CST  Lidocaine inj., 4 mL, Intra-Articular, Once, first dose 03/06/19 10:43:00 CST, stop date 03/06/19 10:43:00 CST  Asp/Inj (Bilateral) Major Jnt/Bursa [id number], 03/07/19 7:54:00 CST, [facility] Clinic, Routine, 03/07/19 7:54:00 CST  Asp/Inj (Bilateral) Major Jnt/Bursa [id number], 03/06/19  10:43:00 CST, [facility], [facility], 03/06/19 10:43:00 CST  Clinic Follow up, *Est. 03/14/19 3:00:00 CDT, Order for future visit, Primary osteoarthritis of left knee  [facility]  Clinic Follow up, *Est. 03/13/19 3:00:00 CDT, Order for future visit, Osteoarthritis of right knee  [facility]  No Charge Per Visit PC, Primary osteoarthritis of left knee  [facility] of right knee, [facility], 03/07/19 7:54:00 CST  Office/Outpatient Visit Level 3 Established 02012 PC, Osteoarthritis of right knee  Osteoarthritis of left knee, [facility], 03/06/19 10:43:00 CST  ?  Left knee pain?M25.562  Ordered:  XR Knee Left 4 or More Views, Routine, 03/07/19 7:40:00 CST, Pain, None, Ambulatory, Rad Type, Left knee pain, 03/07/19 7:40:00 CST  ?  Right knee pain?M25.561  Ordered:  XR Knee Right 4 or More Views, Routine, 03/07/19 7:40:00 CST, Pain, None, Ambulatory, Rad Type, Right knee pain, 03/07/19 7:40:00 CST  ?   Problem List/Past Medical History  Ongoing  High cholesterol  Obesity  Primary osteoarthritis of left knee  Primary osteoarthritis of right knee  Primary osteoarthritis of right knee  Tobacco user  Historical  No qualifying data  Procedure/Surgical History  Arthroscopy of knee with medial meniscus repair   Medications  AMOXICILLIN 500 MG TABLET, 500 mg= 1 tab(s), Oral, TID,? ?Not taking  Aspir 81 oral delayed release tablet, 81 mg= 1 tab(s), Oral, Daily  AZITHROMYCIN 250 MG TABLET, Oral, As Directed,? ?Not taking  CHLORHEXIDINE 0.12% RINSE,? ?Not taking  HYDROCODON-ACETAMINOPH 7.5-325, Oral, q4hr,? ?Not taking  HYDROMET (HYDROCOD/ELDER)5/1.5MG SYR, Oral, QID,? ?Not taking  IBUPROFEN 800 MG TABLET, Oral  lidocaine 2% injectable solution, 4 mL, Intra-Articular, Once  lidocaine 2% injectable solution, 4 mL, Intra-Articular, Once  pravastatin, Oral, Daily  Synvisc, 4 mL, Intra-Articular, Once  Synvisc, 4 mL,  Intra-Articular, Once  TAMSULOSIN 0.4MG CAPSULES, 0.4 mg= 1 cap(s), Oral, Daily  Zantac  Allergies  No Known Allergies  Social History  Alcohol  Current, Beer, Wine, 1-2 times per month, 02/16/2017  Employment/School  Employed, 02/16/2017  Home/Environment  Lives with Spouse. Living situation: Home/Independent., 02/16/2017  Substance Abuse  Never, 02/16/2017  Tobacco  4 or less cigarettes(less than 1/4 pack)/day in last 30 days, No, 03/07/2019  4 or less cigarettes(less than 1/4 pack)/day in last 30 days, No, 03/01/2019  Current every day smoker, Cigarettes, 02/16/2017  Family History  Family history is unknown  Health Maintenance  Health Maintenance  ???Pending?(in the next year)  ??? ??OverDue  ??? ? ? ?Aspirin Therapy for CVD Prevention due??02/16/18??and every 1??year(s)  ??? ? ? ?Smoking Cessation due??03/09/18??and every 1??year(s)  ??? ??Due?  ??? ? ? ?ADL Screening due??03/07/19??and every 1??year(s)  ??? ? ? ?Alcohol Misuse Screening due??03/07/19??and every 1??year(s)  ??? ? ? ?Colorectal Screening due??03/07/19??and every?  ??? ? ? ?Diabetes Screening due??03/07/19??and every?  ??? ? ? ?Lung Cancer Screening due??03/07/19??and every 1??year(s)  ??? ? ? ?Tetanus Vaccine due??03/07/19??and every 10??year(s)  ??? ? ? ?Zoster Vaccine due??03/07/19??and every 100??year(s)  ??? ??Due In Future?  ??? ? ? ?Depression Screening not due until??03/08/19??and every 1??year(s)  ??? ? ? ?Blood Pressure Screening not due until??02/29/20??and every 1??year(s)  ??? ? ? ?Body Mass Index Check not due until??02/29/20??and every 1??year(s)  ???Satisfied?(in the past 1 year)  ??? ??Satisfied?  ??? ? ? ?Blood Pressure Screening on??03/07/19.??Satisfied by Bonita Lopes LPN  ??? ? ? ?Body Mass Index Check on??03/07/19.??Satisfied by Bonita Lopes LPN  ??? ? ? ?Depression Screening on??03/08/18.??Satisfied by Magdalene Welch MA  ??? ? ? ?Obesity Screening on??03/07/19.??Satisfied by Moses COLLINS,  [facility] [place]  ?  ?

## 2022-05-02 NOTE — HISTORICAL [facility]ER
This is a historical note converted from [employer]. Formatting and pictures may have been removed.  Please reference [employer]gelia for original formatting and attached multimedia. Chief Complaint  2W POSTOP BILATERAL TKR SX 6-15-20  History of Present Illness  Patient here today for follow-up from bilateral TKA  Having less pain  No wound drainage  Tolerating PT well  Still with some swelling and stiffness  Review of Systems  Constitutional: No fever, No chills.  Respiratory: No shortness of breath, No cough.  Cardiovascular: No chest pain.  Gastrointestinal: No nausea, No vomiting, No diarrhea, No constipation, No heartburn.  Genitourinary: No dysuria, No hematuria.  Hematology/Lymphatics: No bleeding tendency.  Endocrine: No polyuria.  Neurologic: Alert and oriented X4, No numbness, No tingling.  Psychiatric: No anxiety, No depression.  Integumentary:? negative except as documented in history of present illness  Physical Exam  Vitals & Measurements  T:?36.1? ?C (Oral)? HR:?80(Peripheral)? BP:?132/87?  HT:?185?cm? WT:?119.2?kg? BMI:?34.83?  Bilateral knee exam  Wound healing well without s/s infection  ROM?2 to 115  Walking with altered gait  Mild swelling to the knee  CMS intact to the bilateral lower extremity  ??  x-rays show intact prosthesis in good position  ??  staples removed in office today  wound care instructions discussed with patient  ??  Plan:  Continue with therapy and home exercises  Follow-up in 1 month  Assessment/Plan  1.?Status post total bilateral knee replacement?Z96.653  Ordered:  Clinic Follow up, *Est. 08/09/20 3:00:00 CDT, Order for future visit, Status post total bilateral knee replacement, [facility]  Post-Op follow-up visit [id number] PC, Status post total bilateral knee replacement, Orthopa[facility]ics Clinic, 07/09/20 13:39:00 CDT  ?  Referrals  Clinic Follow up, *Est. 08/09/20 3:00:00 CDT, Order for future visit, Status post total bilateral knee replacement, [facility]  PT/OT External Referral,  07/09/20 13:21:00 CDT, Status post total bilateral knee replacement, Anit Grav Hip Abductor & Extensor Exc.  Isotonic hamstring & Closed Chain Quad  Therapeutic Excercise  Stretch and Strengthen  No Dry Needling, AROM/AAROM/PROM/FWB, 3 X Week, **P...   Problem List/Past Medical History  Ongoing  High cholesterol  HTN - Hypertension  Obesity  Osteoarthritis of knees, bilateral  Primary osteoarthritis of left knee  Primary osteoarthritis of right knee  Primary osteoarthritis of right knee  Tobacco user  Historical  No qualifying data  Procedure/Surgical History  Replacement of Left Knee Joint with Synthetic Substitute, Cemented, Open Approach (06/15/2020)  Replacement of Right Knee Joint with Synthetic Substitute, Cemented, Open Approach (06/15/2020)  Total Knee Arthroplasty Bilateral (Bilateral) (06/15/2020)  Arthroscopy of knee with medial meniscus repair   Medications  ascorbic acid 500 mg oral tablet, 500 mg= 1 tab(s), Oral, Daily  aspirin 81 mg oral Delayed Release (EC) tablet, 81 mg= 1 tab(s), Oral, BID  Colace 100 mg oral capsule, 100 mg= 1 cap(s), Oral, BID  pravastatin 40 mg oral tablet, 40 mg= 1 tab(s), Oral, qPM  Proscar 5 mg oral tablet, 5 mg= 1 tab(s), Oral, Daily  tamsulosin 0.4 mg oral capsule, 0.4 mg= 1 cap(s), Oral, Daily  Allergies  No Known Allergies  Social History  Abuse/Neglect  No, 07/09/2020  No, 06/17/2020  No, 06/15/2020  Alcohol  Current, Beer, Wine, 1-2 times per month, 02/16/2017  Employment/School  Employed, 02/16/2017  Home/Environment  Lives with Spouse. Living situation: Home/Independent., 02/16/2017  Substance Use  Never, 02/16/2017  Tobacco  Former smoker, quit more than 30 days ago, No, 07/09/2020  Former smoker, quit more than 30 days ago, No, 06/15/2020  Family History  Family history is unknown  Health Maintenance  Health Maintenance  ???Pending?(in the next year)  ??? ??OverDue  ??? ? ? ?Coronary Artery Disease Maintenance-Antiplatelet Agent Prescribed due??and every?  ??? ?  ? ?Advance Directive due??01/01/20??and every 1??year(s)  ??? ??Due?  ??? ? ? ?ADL Screening due??07/09/20??and every 1??year(s)  ??? ? ? ?Abdominal Aortic Aneurysm Screening due??07/09/20??and every 100??year(s)  ??? ? ? ?Hypertension Management-Education due??07/09/20??and every 1??year(s)  ??? ? ? ?Lung Cancer Screening due??07/09/20??and every 1??year(s)  ??? ? ? ?Medicare Annual Wellness Exam due??07/09/20??and every 1??year(s)  ??? ? ? ?Pneumococcal Vaccine due??07/09/20??Variable frequency  ??? ? ? ?Tetanus Vaccine due??07/09/20??and every 10??year(s)  ??? ? ? ?Zoster Vaccine due??07/09/20??and every 100??year(s)  ??? ??Due In Future?  ??? ? ? ?Alcohol Misuse Screening not due until??01/01/21??and every 1??year(s)  ??? ? ? ?Cognitive Screening not due until??01/01/21??and every 1??year(s)  ??? ? ? ?Fall Risk Assessment not due until??01/01/21??and every 1??year(s)  ??? ? ? ?Functional Assessment not due until??01/01/21??and every 1??year(s)  ??? ? ? ?Obesity Screening not due until??01/01/21??and every 1??year(s)  ??? ? ? ?Smoking Cessation not due until??01/01/21??and every 1??year(s)  ??? ? ? ?Hypertension Management-BMP not due until??06/22/21??and every 1??year(s)  ??? ? ? ?Aspirin Therapy for CVD Prevention not due until??06/22/21??and every 1??year(s)  ???Satisfied?(in the past 1 year)  ??? ??Satisfied?  ??? ? ? ?Alcohol Misuse Screening on??05/21/20.??Satisfied by Bonifacio Marcus  ??? ? ? ?Aspirin Therapy for CVD Prevention on??06/22/20.??Satisfied by Aaron Brooke  ??? ? ? ?Blood Pressure Screening on??07/09/20.??Satisfied by Amira Yoo LPN  ??? ? ? ?Body Mass Index Check on??07/09/20.??Satisfied by Amira Yoo LPN  ??? ? ? ?Coronary Artery Disease Maintenance-Lipid Lowering Therapy on??06/22/20.??Satisfied by Casandra Cheng RN  ??? ? ? ?Depression Screening on??07/09/20.??Satisfied by Amira Yoo LPN  ??? ? ? ?Diabetes Screening on??06/22/20.??Satisfied by Dianelys Crain  M  ??? ? ? ?Fall Risk Assessment on??07/09/20.??Satisfied by Amira Yoo LPN  ??? ? ? ?Functional Assessment on??06/22/20.??Satisfied by Casandra Cheng RN  ??? ? ? ?Hypertension Management-Blood Pressure on??07/09/20.??Satisfied by Amira Yoo LPN  ??? ? ? ?Lipid Screening on??06/18/20.??Satisfied by Harris Sun  ??? ? ? ?Obesity Screening on??07/09/20.??Satisfied by Amira Yoo LPN  ??? ? ? ?Smoking Cessation on??06/22/20.??Satisfied by Casandra Cheng RN  ??? ? ? ?Smoking Cessation (Coronary Artery Disease) on??06/22/20.??Satisfied by Casandra Cheng RN  ?

## 2022-05-14 NOTE — PROGRESS NOTES
Patient:   Jacob Bruner            MRN: [id number]            FIN: [id number]               Age:   66 years     Sex:  Male     :  1955   Associated Diagnoses:   Obesity   Author:   Dena Cuevas      Basic Information   Admit information:     Today's Information:  Non surgical weight loss clinic monthly visit # 6   .    Due to COVID-19 community restrictions, remote phone visit performed today.    Mr. Bruner is a 65 yo established patient that presents today for monthly follow up for non-surgical weight loss clinic. No complaints at present. No changes in medical history. Upcoming appt with PCP and cardiologist this month for routine annual FU.     BP reported normotensive.     Diet: fair compliance recently due to travel, ready to restart diet  Exercise: inconsistent walking program   Meds: Completed 5th month of Qsymia. Qsymia 15/92 mg PO Q AM. No negative side effects reported.    Ht 72 in  Highest weight 242 #   BMI 32.8  Initial weight 238#  BMI 32.3  21 230 #  BMI 31.2  22  236 #  BMI 32.0  22  232 #  BMI 31.5    4 # loss this past month.    Personal goal 210 #    PCP / urologist Dr. Len Downing  Pulm: Dr. Elvin Madrigal  Ortho Dr. Manohar Gale  Cardiologist Dr. Jeff Doe        Chief Complaint   Follow up weight loss      Review of Systems   All ROS negative      Health Status   Allergies:    Allergic Reactions (Selected)  No Known Allergies,    Allergies (1) Active Reaction  No Known Allergies None Documented     Current medications:  (Selected)   Prescriptions  Prescribed  Colace 100 mg oral capsule: 100 mg = 1 cap(s), Oral, BID, # 60 cap(s), 0 Refill(s), Pharmacy: [facility] #[id number], 185, cm, Height/Length Dosing, 20 16:07:00 CDT, 119.2, kg, Weight Dosing, 20 16:07:00 CDT  Proscar 5 mg oral tablet: 5 mg = 1 tab(s), Oral, Daily, # 30 tab(s), 0 Refill(s), Pharmacy: [facility] #[id number], 185, cm, Height/Length Dosing, 20 16:07:00 CDT,  119.2, kg, Weight Dosing, 06/17/20 16:07:00 CDT  ascorbic acid 500 mg oral tablet: 500 mg = 1 tab(s), Oral, Daily, # 30 tab(s), 0 Refill(s), Pharmacy: [facility] #[id number], 185, cm, Height/Length Dosing, 06/17/20 16:07:00 CDT, 119.2, kg, Weight Dosing, 06/17/20 16:07:00 CDT  aspirin 81 mg oral Delayed Release (EC) tablet: 81 mg = 1 tab(s), Oral, BID, # 60 tab(s), 0 Refill(s), Pharmacy: [facility] #[id number], 185, cm, Height/Length Dosing, 06/17/20 16:07:00 CDT, 119.2, kg, Weight Dosing, 06/17/20 16:07:00 CDT  clindamycin 300 mg oral capsule: See Instructions, 2 tabs 1 hour before procedure   and 1 tab 6 hours after the first dose., # 3 tab(s), 0 Refill(s), Pharmacy: [facility] #[id number], 185, cm, Height/Length Dosing, 08/11/20 14:03:00 CDT, 119.2, kg, Weight Dosing, 08/11/20 14:03:...  pravastatin 40 mg oral tablet: 40 mg = 1 tab(s), Oral, qPM, # 30 tab(s), 0 Refill(s), Pharmacy: [facility] #[id number], 185, cm, Height/Length Dosing, 06/17/20 16:07:00 CDT, 119.2, kg, Weight Dosing, 06/17/20 16:07:00 CDT  tamsulosin 0.4 mg oral capsule: 0.4 mg = 1 cap(s), Oral, Daily, # 30 cap(s), 0 Refill(s), Pharmacy: [facility] #[id number], 185, cm, Height/Length Dosing, 06/17/20 16:07:00 CDT, 119.2, kg, Weight Dosing, 06/17/20 16:07:00 CDT   Problem list:    All Problems  Primary osteoarthritis of right knee / SNOMED CT 0458168031 / Confirmed  High cholesterol / SNOMED CT 46101210 / Confirmed  Tobacco user / SNOMED CT 277734000 / Probable  Obesity / SNOMED CT 2452904248 / Probable  Primary osteoarthritis of left knee / SNOMED CT 8954274171 / Confirmed  Primary osteoarthritis of right knee / SNOMED CT 5383968317 / Confirmed  HTN - Hypertension / SNOMED CT 0786258752 / Confirmed  Osteoarthritis of knees, bilateral / SNOMED CT 3090923278 / Confirmed  S/p total knee replacement, bilateral / SNOMED CT 6355468935 / Confirmed  Arthritis of right hip / SNOMED CT 6261757250 / Confirmed      Physical  Examination      No qualifying data available   Unable to complete due to remote visit       Impression and Plan   Diagnosis     Obesity ([id number] E66.9).     Education and Follow-up:       Counseled: Patient, Regarding diagnosis, Regarding treatment, Regarding medications.    Impression: Obesity BMI 31.5  Down 4# this month      Plan:   -  Follow diet recommended by dietitian.   - Restart cardio exercise plan. Walking 3-4 days per week. Start slow and build gradually to avoid injury. Goal of 45 mins 4 days per week.   - Dr. Schilling refilled Qsymia to 15/92 mg capsule (month 6). Discussed risks/benefits of AOM medications. Discussed risks/benefits, importance of monitoring BP at home and reasons for discontinuation.       Qsymia 15mg/92 mg capsule   Si capsule PO Q AM   Disp# 30 capsules        NR  - FU 1 month  - Keep routine FU appt with PCP/Specialists as scheduled

## 2022-05-14 NOTE — PROGRESS NOTES
Patient:   Jacob Bruner            MRN: [id number]            FIN: [id number]               Age:   66 years     Sex:  Male     :  1955   Associated Diagnoses:   Obesity, Class I, BMI 30-34.9   Author:   Dena Cuevas      Basic Information   Admit information:     Today's Information:  Weight loss clinic follow up appt visit # 7   .    Due to COVID-[facility] restrictions, remote phone visit performed today.    Mr. Bruner is a 67 yo established patient that presents today for monthly follow up for non-surgical weight loss clinic. No complaints at present.  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 Had a period since last appt 22 without Qsymia and noticed weight gain during that time. Since resuming Qsymia 2 weeks ago has already begun losing weight. Good satiety now.     last visit 22- 232#, highest reported last month without Qsymia 238 #, now down to 230#.   Feeling encouraged being back on track.     BP reported normotensive.     Diet: good compliance  Exercise: walking program on weekends  Meds: Completed 6th month of Qsymia. Qsymia 15/92 mg PO Q AM. No negative side effects reported.    Ht 72 in  Highest weight 242 #   BMI 32.8  Initial weight 238#  BMI 32.3  21 230 #  BMI 31.2  22  236 #  BMI 32.0  22  232 #  BMI 31.5  Up to 238# 2022 per pt report  22 230 #  BMI 31.2    8 # loss this month.    Personal goal 210 #    PCP / urologist Dr. Len Downing  Pulm: Dr. Elvin Madrigal  Ortho Dr. Manohar Gale  Cardiologist Dr. Jeff Doe        Chief Complaint   Follow up weight loss      Review of Systems   All ROS negative      Health Status   Allergies:    Allergic Reactions (Selected)  No Known Allergies,    Allergies (1) Active Reaction  No Known Allergies None Documented     Current medications:  (Selected)   Prescriptions  Prescribed  Colace 100 mg oral capsule:  100 mg = 1 cap(s), Oral, BID, # 60 cap(s), 0 Refill(s), Pharmacy: [facility] #[id number], 185, cm, Height/Length Dosing, 06/17/20 16:07:00 CDT, 119.2, kg, Weight Dosing, 06/17/20 16:07:00 CDT  Proscar 5 mg oral tablet: 5 mg = 1 tab(s), Oral, Daily, # 30 tab(s), 0 Refill(s), Pharmacy: [facility] #[id number], 185, cm, Height/Length Dosing, 06/17/20 16:07:00 CDT, 119.2, kg, Weight Dosing, 06/17/20 16:07:00 CDT  ascorbic acid 500 mg oral tablet: 500 mg = 1 tab(s), Oral, Daily, # 30 tab(s), 0 Refill(s), Pharmacy: [facility] #[id number], 185, cm, Height/Length Dosing, 06/17/20 16:07:00 CDT, 119.2, kg, Weight Dosing, 06/17/20 16:07:00 CDT  aspirin 81 mg oral Delayed Release (EC) tablet: 81 mg = 1 tab(s), Oral, BID, # 60 tab(s), 0 Refill(s), Pharmacy: [facility] #[id number], 185, cm, Height/Length Dosing, 06/17/20 16:07:00 CDT, 119.2, kg, Weight Dosing, 06/17/20 16:07:00 CDT  clindamycin 300 mg oral capsule: See Instructions, 2 tabs 1 hour before procedure   and 1 tab 6 hours after the first dose., # 3 tab(s), 0 Refill(s), Pharmacy: [facility] #[id number], 185, cm, Height/Length Dosing, 08/11/20 14:03:00 CDT, 119.2, kg, Weight Dosing, 08/11/20 14:03:...  pravastatin 40 mg oral tablet: 40 mg = 1 tab(s), Oral, qPM, # 30 tab(s), 0 Refill(s), Pharmacy: [facility] #[id number], 185, cm, Height/Length Dosing, 06/17/20 16:07:00 CDT, 119.2, kg, Weight Dosing, 06/17/20 16:07:00 CDT  tamsulosin 0.4 mg oral capsule: 0.4 mg = 1 cap(s), Oral, Daily, # 30 cap(s), 0 Refill(s), Pharmacy: [facility] #[id number], 185, cm, Height/Length Dosing, 06/17/20 16:07:00 CDT, 119.2, kg, Weight Dosing, 06/17/20 16:07:00 CDT  Documented Medications  Documented  Qsymia 11.25 mg-69 mg oral capsule, extended release: 1 cap(s), Oral, qAM   Problem list:    All Problems  Primary osteoarthritis of right knee / SNOMED CT 9062741081 / Confirmed  High cholesterol / SNOMED CT 22073446 / Confirmed  Tobacco user / SNOMED CT  146607068 / Probable  Obesity / SNOMED CT 1090015670 / Probable  Primary osteoarthritis of left knee / SNOMED CT 7464649359 / Confirmed  Primary osteoarthritis of right knee / SNOMED CT 8397442126 / Confirmed  HTN - Hypertension / SNOMED CT 0380505942 / Confirmed  Osteoarthritis of knees, bilateral / SNOMED CT 4565186711 / Confirmed  S/p total knee replacement, bilateral / SNOMED CT 5935991778 / Confirmed  Arthritis of right hip / SNOMED CT 6290322075 / Confirmed      Physical Examination      Vital Signs (last 24 hrs)_____  Last Charted___________  Weight      104.4 kg  ( 07:45)     Measurements from flowsheet : Measurements   2022 7:45 CDT       Weight Dosing             104.4 kg                             Weight Measured           104.4 kg                             Weight Measured and Calculated in Lbs     230.16 lb                             Weight Measured and Calculated in Lbs     230.1626     Unable to complete due to remote visit       Impression and Plan   Diagnosis     Obesity, Class I, BMI 30-34.9 ([id number]-KE E66.9).     Education and Follow-up:       Counseled: Patient, Regarding diagnosis, Regarding treatment, Regarding medications.    Impression: Obesity BMI 31.2  Down 8# this month      Plan:   -  Follow diet recommended by dietitian.   - Continue cardio exercise plan. Walking 3-4 days per week. Start slow and build gradually to avoid injury. Goal of 45 mins 4 days per week.   - Dr. Schilling refilled Qsymia to 15/92 mg capsule (month 7). recently filled on 22 but ok to hold RX until approved 30 day fill date at [facility]. Want to avoid patient running out of medication again.   - Discussed risks/benefits of AOM medications. Discussed risks/benefits, importance of monitoring BP at home and reasons for discontinuation.       Qsymia 15mg/92 mg capsule   Si capsule PO Q AM   Disp# 30 capsules        NR  - FU 1 month  - Keep routine FU appt with PCP/Specialists as scheduled    - Discussed  plan of care with medical bariatrician Dr. Olaf Schilling and he is agreement with plan.

## 2022-05-31 ENCOUNTER — CLINICAL SUPPORT (OUTPATIENT)
Dept: BARIATRICS | Facility: HOSPITAL | Age: 67
End: 2022-05-31

## 2022-05-31 DIAGNOSIS — E66.9 OBESITY (BMI 30.0-34.9): ICD-10-CM

## 2022-05-31 PROBLEM — E66.811 OBESITY (BMI 30.0-34.9): Status: ACTIVE | Noted: 2022-05-31

## 2022-05-31 RX ORDER — PHENTERMINE AND TOPIRAMATE 15; 92 MG/1; MG/1
1 CAPSULE, EXTENDED RELEASE ORAL DAILY
Qty: 30 CAPSULE | Refills: 1 | Status: SHIPPED | OUTPATIENT
Start: 2022-05-31 | End: 2022-06-10

## 2022-05-31 NOTE — PROGRESS NOTES
Patient ID: Jacob Bruner is a 67 y.o. male.  Chief Complaint: Follow up for Wt. Loss  Phone visit. 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      HPI:   HPI  Patient presents for 1 month wt loss f/u visit. No reported side effects to meds.  Medication is working well to help improve adherence to diet plan. Denies side effects, specifically chest pain or palpitations. Pt. is on Qsymia 15/92 mg daily.  Pt. denies any other complaints at this time. BP and HR normal.   Unable to exercise/stick to walking program due to 12+ hour days.  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  Increased eating out due to above resulted in wt gain of 5 lbs. Less consistent  with meds these last 3 weeks.  ROS:   Review of Systems  Constitutional: Wt. Gain as above  Respiratory: No shortness of breath, No cough, No wheezing.   Cardiovascular: No chest pain, No palpitations, No peripheral edema.   Gastrointestinal: No nausea, No vomiting, No diarrhea   Integumentary: No rash, No pruritus.   Neurologic: No abnormal balance, No confusion, No headache.  Psychiatric: No anxiety, No depression, Not suicidal, No hallucinations.        PE:      General: Alert and oriented, No acute distress. (per phone call)   Reported Wt. 235 lbs  Reported /80  Rported HR 80 bpm  Psychiatric: Cooperative, Appropriate mood & affect, Normal judgment, Non-suicidal.  Physical Exam    Assessment/Plan       1. Obesity (BMI 30.0-34.9)    - refill Qsymia 15/92mg once daily    - Long discussion with pt on importance of consistent exercise/walking program.    - Pt education on importance of consistently taking medication.    - Pt understands diet program just needs improved adherence.              ASP - Dose optimization/Non-Renal dose adjustment

## 2022-09-02 NOTE — PROGRESS NOTES
Subjective:       Patient ID:     Chief Complaint: Nonsurgical Weight Loss Follow-up. Phone call visit due to pt being out of town for work    HPI: Mr. Bruner is a 68 y/o male patient phone call visit today to follow up for non-surgical weight loss clinic. No complaints and would like to continue on his Qsymia. Pt denies any side effects or issues from the medication and notices that hunger and cravings increase significantly when he is off of it. Pt ran out of medication a few weeks ago and noted an increase in his weight due to this issue. 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     Diet: good compliance for the most part  Exercise: Walking  Meds: Xzrdcg72/92mg tab daily.     Ht. 72in  Weights:  Highest Weight: 242#  BMI: 32  Initial Weigh: 238#  BMI: 32.3  11/30/32: 230#  BMI: 31.2  1/4/22: 236#   BMI: 32.0  2/1/22: 232#   BMI: 31.5  4/26/22: 230#   BMI: 31.2  9/6/22: 236#   BMI: 32.0   Weight gain of 6# this month    PCP/urologist: Dr. Len Downing  Pulm: Dr. Elvin Madrigal  Ortho: Dr. Manohar Gale  Cardiologist: Dr. Doe     Review of Systems   Constitutional: Negative.    Respiratory: Negative.    Cardiovascular: Negative.    Gastrointestinal: Negative.    Endocrine: Negative.    Musculoskeletal: Negative.    Integumentary:  Negative.   Neurological: Negative.    Psychiatric/Behavioral: Negative.        Objective:      Physical Exam  Unable to complete due to phone call visit.   Psychiatric:         Mood and Affect: Mood normal.         Behavior: Behavior normal.         Judgment: Judgment normal.     Assessment:       Problem List Items Addressed This Visit    Obesity        Visit Diagnoses      obesity         Plan:       Plan:  Will continue Qsymia 15/92mg tab. No issues for patient and feels as though it controls appetite and cravings. Dr. Schilling to call in refill to pts pharmacy.   Discussed importance of strict dietary  compliance as recommended by dietitian. Limit processed foods and starchy CHOs. Eliminate high calorie liquids. Increased water intake. Practice mindful eating patterns. Recommend food journaling for accountability.  Continue exercise. Goal cardio exercise 4-5 days per week for 30-45 mins per encounter. Gradually increase exercise to build stamina and prevent injury. Is currently walking but needs to increase the intensity of exercise.   Discussed types of weight loss medications, risks/benefits of these medications. Discussed importance of monitoring blood pressure and heart rate at home and if BP greater than 150/90 or heart rate greater than 120 bpm, recommend immediate discontinuation of this medication. Discussed other possible side effects of this medication that would require immediate discontinuation. Patient verbalized understanding.   Discussed plan of care with medical bariatrician Dr. Olaf Schilling and he was in agreement with proposed treatment plan.   Keep routine scheduled appointments with PCP/specialists.   FU 1 month

## 2022-09-06 ENCOUNTER — CLINICAL SUPPORT (OUTPATIENT)
Dept: BARIATRICS | Facility: HOSPITAL | Age: 67
End: 2022-09-06
Payer: MEDICARE

## 2022-09-06 VITALS — HEIGHT: 72 IN | BODY MASS INDEX: 31.97 KG/M2 | WEIGHT: 236 LBS

## 2022-09-06 DIAGNOSIS — E66.01 MORBID OBESITY DUE TO EXCESS CALORIES: Primary | ICD-10-CM

## 2022-09-07 RX ORDER — PHENTERMINE AND TOPIRAMATE 15; 92 MG/1; MG/1
1 CAPSULE, EXTENDED RELEASE ORAL DAILY
Qty: 30 CAPSULE | Refills: 1 | Status: SHIPPED | OUTPATIENT
Start: 2022-09-07 | End: 2022-10-07

## 2022-10-13 DIAGNOSIS — Z87.891 PERSONAL HISTORY OF TOBACCO USE, PRESENTING HAZARDS TO HEALTH: Primary | ICD-10-CM

## 2022-12-13 ENCOUNTER — PATIENT MESSAGE (OUTPATIENT)
Dept: RESEARCH | Facility: HOSPITAL | Age: 67
End: 2022-12-13
Payer: MEDICARE

## 2022-12-15 ENCOUNTER — PATIENT MESSAGE (OUTPATIENT)
Dept: RESEARCH | Facility: HOSPITAL | Age: 67
End: 2022-12-15
Payer: MEDICARE

## 2023-01-03 ENCOUNTER — HOSPITAL ENCOUNTER (OUTPATIENT)
Dept: RADIOLOGY | Facility: HOSPITAL | Age: 68
Discharge: HOME | End: 2023-01-03
Attending: INTERNAL MEDICINE
Payer: MEDICARE

## 2023-01-03 DIAGNOSIS — Z87.891 PERSONAL HISTORY OF TOBACCO USE, PRESENTING HAZARDS TO HEALTH: ICD-10-CM

## 2023-01-03 PROCEDURE — 71271 CT THORAX LUNG CANCER SCR C-: CPT | Mod: TC

## 2023-09-07 ENCOUNTER — PATIENT MESSAGE (OUTPATIENT)
Dept: RESEARCH | Facility: HOSPITAL | Age: 68
End: 2023-09-07
Payer: MEDICARE

## 2024-02-12 ENCOUNTER — HOSPITAL ENCOUNTER (OUTPATIENT)
Dept: RADIOLOGY | Facility: HOSPITAL | Age: 69
Discharge: HOME | End: 2024-02-12
Attending: INTERNAL MEDICINE
Payer: MEDICARE

## 2024-02-12 DIAGNOSIS — Z87.891 HISTORY OF TOBACCO USE: ICD-10-CM

## 2024-02-12 PROCEDURE — 71271 CT THORAX LUNG CANCER SCR C-: CPT | Mod: TC

## 2024-08-02 ENCOUNTER — OFFICE VISIT (OUTPATIENT)
Dept: SURGERY | Facility: CLINIC | Age: 69
End: 2024-08-02
Payer: MEDICARE

## 2024-08-02 DIAGNOSIS — E66.9 OBESITY (BMI 30.0-34.9): Primary | ICD-10-CM

## 2024-08-02 RX ORDER — TIRZEPATIDE 2.5 MG/.5ML
2.5 INJECTION, SOLUTION SUBCUTANEOUS
Qty: 2 ML | Refills: 0 | Status: SHIPPED | OUTPATIENT
Start: 2024-08-02 | End: 2024-08-24

## 2024-08-02 NOTE — PROGRESS NOTES
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     Each patient to whom I provide medical services by telemedicine is:  (1) informed of the relationship between the physician and patient and the respective role of any other health care provider with respect to management of the patient; and (2) notified that they may decline to receive medical services by telemedicine and may withdraw from such care at any time. Patient verbally consented to receive this service via voice-only telephone call.      Chief Complaint: Nonsurgical Weight Loss Follow-up. 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      HPI: Mr. Bruner is a 68 y/o male patient phone call visit today to follow up for non-surgical weight loss clinic.   He reports recent weight gain and decreased satiety. He has previously tried Qsymia anti-obesity medication but would like to discuss GLP1 receptor agonist injectable medications today.   Pt denies any side effects or issues from Qsymia, but would like to try a different medication to curb appetite and encourage weight loss.      Diet: good compliance majority of the time  Exercise: Walking  Meds: Previous Qsymia 15/92mg tab daily. Last prescribed in 2022 per medical bariatrician Dr. Olaf Schilling. No longer taking.   Reviewed remaining home meds over the phone today: Flomax and Pravastatin.     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 Denies previous allergic reaction to  tirzepatide or any ingredients in ZepBound.      Ht. 72in  Weights:  - 8/11/2020: 262 # BMI 35.5  - 2/8/2021: 237 # BMI 32.1  - 3/2/23: 243 #  BMI 33.0  - Current reported weight: 245 #  BMI 33.2    Reported BP today 120/80    Upcoming PCP appt next week with Dr. Len Downing for labs, wellness.   Following with cardiologist, last appt with Dr. Doe was last month.     No changes in medical history since last visit with medically supervised weight loss program.      PCP/urologist: Dr. Len Downing  Pulm: Dr. Elvin Madrigal  Ortho: Dr. Manohar Gale  Cardiologist: Dr. Doe     Assessment and plan:    - Start injectable GLP-1 receptor agonist medications once weekly to assist with weight loss, curb appetite, and improve satiety.   Start with low dose Zepbound once weekly for next 4 weeks.     - Order CBC, CMP, HgbA1c as baseline labs    - Discussed indications for GLP-1 receptor agonist medications. Discussed risks/benefits of medication and importance of routine follow up to monitor progress and for any possible side effects.     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 Denies previous allergic reaction to tirzepatide or any ingredients in ZepBound.     Zepbound® is an injectable prescription medicine that may help adults with obesity, or with excess weight (overweight) who also have weight-related medical problems, lose weight and keep it off. It should be used with a reduced-calorie diet and increased physical activity.  Zepbound contains tirzepatide and should not be used with other tirzepatide-containing products or any GLP-1 receptor agonist medicines. It is not known if Zepbound is safe and effective when taken with other prescription, over-the-counter, or herbal weight loss products. It is not known if Zepbound can be used in people who have had pancreatitis.    Risks including serious side effects discussed  with patient:  Severe stomach problems. Stomach problems, sometimes severe, have been reported in people who use Zepbound. Tell your healthcare provider if you have stomach problems that are severe or will not go away.  Kidney problems (kidney failure). Diarrhea, nausea, and vomiting may cause a loss of fluids (dehydration), which may cause kidney problems. It is important for you to drink fluids to help reduce your chance of dehydration.  Gallbladder problems. Gallbladder problems have happened in some people who use Zepbound. Tell your healthcare provider right away if you get symptoms of gallbladder problems, which may include pain in your upper stomach (abdomen), fever, yellowing of skin or eyes (jaundice), or priyank-colored stools.  Inflammation of the pancreas (pancreatitis). Stop using Zepbound and call your healthcare provider right away if you have severe pain in your stomach area (abdomen) that will not go away, with or without vomiting. You may feel the pain from your abdomen to your back.  Serious allergic reactions. Stop using Zepbound and get medical help right away if you have any symptoms of a serious allergic reaction, including swelling of your face, lips, tongue or throat, problems breathing or swallowing, severe rash or itching, fainting or feeling dizzy, or very rapid heartbeat.  Low blood sugar (hypoglycemia). Your risk for getting low blood sugar may be higher if you use Zepbound with medicines that can cause low blood sugar, such as a sulfonylurea or insulin. Signs and symptoms of low blood sugar may include dizziness or light-headedness, sweating, confusion or drowsiness, headache, blurred vision, slurred speech, shakiness, fast heartbeat, anxiety, irritability, mood changes, hunger, weakness or feeling jittery.  Changes in vision in patients with type 2 diabetes. Tell your healthcare provider if you have changes in vision during treatment with Zepbound.  Depression or thoughts of suicide. You  should pay attention to changes in your mood, behaviors, feelings or thoughts. Call your healthcare provider right away if you have any mental changes that are new, worse, or worry you.  Common side effects  The most common side effects of Zepbound include nausea, diarrhea, vomiting, constipation, stomach (abdominal) pain, indigestion, injection site reactions, feeling tired, allergic reactions, belching, hair loss, and heartburn. These are not all the possible side effects of Zepbound. Talk to your healthcare provider about any side effect that bothers you or doesnt go away.    Medications plan of care:    E-prescription sent for Tirzepatide injection 2.5 mg subcutaneous once weekly x 4 weeks. Disp 4 pens. No refills.   Zepbound is injected under the skin (subcutaneously) of your stomach (abdomen), thigh, or upper arm.  Use Zepbound 1 time each week, at any time of the day.  Change (rotate) your injection site with each weekly injection. Do not use the same site for each injection.    Patient provided with website for online savings card.     - Encouraged patient to consistently take medication as prescribed, follow diet program, follow consistent exercise/walking program. Patient verbalized understanding.     - FU 4 weeks     OSCAR Girard    I, OSCAR Marcum, am scribing for, and in the presence of, Franky Abdalla MD, performed the above scribed service and the documentation accurately describes the services I performed. I attest to the accuracy of the note.                            This service was not originating from a related E/M service provided within the previous 7 days nor will  to an E/M service or procedure within the next 24 hours or my soonest available appointment.  Prevailing standard of care was able to be met in this audio-only visit.

## 2024-08-15 ENCOUNTER — TELEPHONE (OUTPATIENT)
Dept: SURGERY | Facility: CLINIC | Age: 69
End: 2024-08-15
Payer: MEDICARE

## 2024-08-19 NOTE — TELEPHONE ENCOUNTER
It was ordered in EPIC at time of telemed consult. Previously a pt of [facility] program. Please let me know if anything else needed on our part. I believe everything needed was documented in consult note.

## 2024-09-05 DIAGNOSIS — E66.9 OBESITY (BMI 30.0-34.9): Primary | ICD-10-CM

## 2024-09-30 ENCOUNTER — LAB VISIT (OUTPATIENT)
Dept: LAB | Facility: HOSPITAL | Age: 69
End: 2024-09-30
Attending: SURGERY
Payer: MEDICARE

## 2024-09-30 DIAGNOSIS — E66.811 OBESITY (BMI 30.0-34.9): ICD-10-CM

## 2024-09-30 LAB
ALBUMIN SERPL-MCNC: 3.8 G/DL (ref 3.4–4.8)
ALBUMIN/GLOB SERPL: 1 RATIO (ref 1.1–2)
ALP SERPL-CCNC: 71 UNIT/L (ref 40–150)
ALT SERPL-CCNC: 13 UNIT/L (ref 0–55)
ANION GAP SERPL CALC-SCNC: 9 MEQ/L
AST SERPL-CCNC: 13 UNIT/L (ref 5–34)
BASOPHILS # BLD AUTO: 0.05 X10(3)/MCL
BASOPHILS NFR BLD AUTO: 0.5 %
BILIRUB SERPL-MCNC: 0.6 MG/DL
BUN SERPL-MCNC: 7.9 MG/DL (ref 8.4–25.7)
CALCIUM SERPL-MCNC: 10.8 MG/DL (ref 8.8–10)
CHLORIDE SERPL-SCNC: 107 MMOL/L (ref 98–107)
CO2 SERPL-SCNC: 25 MMOL/L (ref 23–31)
CREAT SERPL-MCNC: 1.11 MG/DL (ref 0.73–1.18)
CREAT/UREA NIT SERPL: 7
EOSINOPHIL # BLD AUTO: 0.15 X10(3)/MCL (ref 0–0.9)
EOSINOPHIL NFR BLD AUTO: 1.6 %
ERYTHROCYTE [DISTWIDTH] IN BLOOD BY AUTOMATED COUNT: 14.1 % (ref 11.5–17)
EST. AVERAGE GLUCOSE BLD GHB EST-MCNC: 111.2 MG/DL
GFR SERPLBLD CREATININE-BSD FMLA CKD-EPI: >60 ML/MIN/1.73/M2
GLOBULIN SER-MCNC: 3.9 GM/DL (ref 2.4–3.5)
GLUCOSE SERPL-MCNC: 99 MG/DL (ref 82–115)
HBA1C MFR BLD: 5.5 %
HCT VFR BLD AUTO: 47.3 % (ref 42–52)
HGB BLD-MCNC: 15.8 G/DL (ref 14–18)
IMM GRANULOCYTES # BLD AUTO: 0.02 X10(3)/MCL (ref 0–0.04)
IMM GRANULOCYTES NFR BLD AUTO: 0.2 %
LYMPHOCYTES # BLD AUTO: 1.61 X10(3)/MCL (ref 0.6–4.6)
LYMPHOCYTES NFR BLD AUTO: 17.6 %
MCH RBC QN AUTO: 31.5 PG (ref 27–31)
MCHC RBC AUTO-ENTMCNC: 33.4 G/DL (ref 33–36)
MCV RBC AUTO: 94.2 FL (ref 80–94)
MONOCYTES # BLD AUTO: 0.56 X10(3)/MCL (ref 0.1–1.3)
MONOCYTES NFR BLD AUTO: 6.1 %
NEUTROPHILS # BLD AUTO: 6.76 X10(3)/MCL (ref 2.1–9.2)
NEUTROPHILS NFR BLD AUTO: 74 %
NRBC BLD AUTO-RTO: 0 %
PLATELET # BLD AUTO: 257 X10(3)/MCL (ref 130–400)
PMV BLD AUTO: 10.2 FL (ref 7.4–10.4)
POTASSIUM SERPL-SCNC: 4.4 MMOL/L (ref 3.5–5.1)
PROT SERPL-MCNC: 7.7 GM/DL (ref 5.8–7.6)
RBC # BLD AUTO: 5.02 X10(6)/MCL (ref 4.7–6.1)
SODIUM SERPL-SCNC: 141 MMOL/L (ref 136–145)
WBC # BLD AUTO: 9.15 X10(3)/MCL (ref 4.5–11.5)

## 2024-09-30 PROCEDURE — 83036 HEMOGLOBIN GLYCOSYLATED A1C: CPT

## 2024-09-30 PROCEDURE — 36415 COLL VENOUS BLD VENIPUNCTURE: CPT

## 2024-09-30 PROCEDURE — 85025 COMPLETE CBC W/AUTO DIFF WBC: CPT

## 2024-09-30 PROCEDURE — 80053 COMPREHEN METABOLIC PANEL: CPT

## 2024-10-02 ENCOUNTER — TELEPHONE (OUTPATIENT)
Dept: SURGERY | Facility: CLINIC | Age: 69
End: 2024-10-02
Payer: MEDICARE

## 2024-10-02 NOTE — TELEPHONE ENCOUNTER
----- Message from Med Assistant Maciel sent at 9/25/2024  1:09 PM CDT -----  Regarding: Labs FU  FU with labs. Pt stated he is going next week to [facility]

## 2024-10-29 DIAGNOSIS — E66.811 OBESITY (BMI 30.0-34.9): Primary | ICD-10-CM

## 2024-10-29 RX ORDER — TIRZEPATIDE 5 MG/.5ML
5 INJECTION, SOLUTION SUBCUTANEOUS WEEKLY
Qty: 4 PEN | Refills: 0 | Status: SHIPPED | OUTPATIENT
Start: 2024-10-29

## 2024-10-29 RX ORDER — TIRZEPATIDE 5 MG/.5ML
5 INJECTION, SOLUTION SUBCUTANEOUS WEEKLY
COMMUNITY
Start: 2024-09-25 | End: 2024-10-29 | Stop reason: SDUPTHER

## 2024-11-29 DIAGNOSIS — E66.811 OBESITY (BMI 30.0-34.9): Primary | ICD-10-CM

## 2024-11-29 RX ORDER — TIRZEPATIDE 5 MG/.5ML
5 INJECTION, SOLUTION SUBCUTANEOUS WEEKLY
Qty: 4 PEN | Refills: 0 | Status: SHIPPED | OUTPATIENT
Start: 2024-11-29

## 2024-12-23 DIAGNOSIS — E66.811 OBESITY (BMI 30.0-34.9): Primary | ICD-10-CM

## 2024-12-23 RX ORDER — TIRZEPATIDE 7.5 MG/.5ML
7.5 INJECTION, SOLUTION SUBCUTANEOUS
Qty: 4 PEN | Refills: 0 | Status: SHIPPED | OUTPATIENT
Start: 2024-12-23

## 2025-01-23 DIAGNOSIS — E66.811 OBESITY (BMI 30.0-34.9): Primary | ICD-10-CM

## 2025-01-23 RX ORDER — TIRZEPATIDE 7.5 MG/.5ML
7.5 INJECTION, SOLUTION SUBCUTANEOUS
Qty: 4 PEN | Refills: 0 | Status: SHIPPED | OUTPATIENT
Start: 2025-01-23 | End: 2025-02-21 | Stop reason: SDUPTHER

## 2025-02-21 DIAGNOSIS — E66.811 OBESITY (BMI 30.0-34.9): Primary | ICD-10-CM

## 2025-02-21 RX ORDER — TIRZEPATIDE 7.5 MG/.5ML
7.5 INJECTION, SOLUTION SUBCUTANEOUS
Qty: 4 PEN | Refills: 0 | Status: SHIPPED | OUTPATIENT
Start: 2025-02-21

## 2025-02-26 ENCOUNTER — TELEPHONE (OUTPATIENT)
Dept: SURGERY | Facility: CLINIC | Age: 70
End: 2025-02-26
Payer: MEDICARE

## 2025-02-26 DIAGNOSIS — R10.9 ABDOMINAL PAIN, UNSPECIFIED ABDOMINAL LOCATION: Primary | ICD-10-CM

## 2025-02-26 NOTE — TELEPHONE ENCOUNTER
Dr. Abdalla is requesting pt come in for appt to eval weight and check new labs. If pt can not make it in person he is requesting him to buy the scale that connects to the [facility].   Reached out to pt. Awaiting response. He will need CBC and CMP prior to appt

## 2025-03-05 ENCOUNTER — HOSPITAL ENCOUNTER (OUTPATIENT)
Dept: RADIOLOGY | Facility: HOSPITAL | Age: 70
Discharge: HOME | End: 2025-03-05
Attending: INTERNAL MEDICINE
Payer: MEDICARE

## 2025-03-05 DIAGNOSIS — Z87.891 HISTORY OF TOBACCO USE: ICD-10-CM

## 2025-03-05 PROCEDURE — 71271 CT THORAX LUNG CANCER SCR C-: CPT | Mod: TC

## 2025-03-11 ENCOUNTER — RESULTS FOLLOW-UP (OUTPATIENT)
Dept: SURGERY | Facility: CLINIC | Age: 70
End: 2025-03-11

## 2025-03-11 NOTE — PROGRESS NOTES
Labs reviewed. Normal results. Please inform patient of normal lab findings. Please let me know if patient has any additional questions or concerns.

## 2025-03-31 ENCOUNTER — TELEPHONE (OUTPATIENT)
Dept: SURGERY | Facility: CLINIC | Age: 70
End: 2025-03-31
Payer: MEDICARE

## 2025-03-31 NOTE — TELEPHONE ENCOUNTER
----- Message from Med Assistant Maciel sent at 3/31/2025  1:19 PM CDT -----  Regarding: Appt  Pt needs appointment with Dr. Abdalla to discuss recent weight and refills of Zepbound. Please contact pt to discuss appt for next available

## 2025-04-10 ENCOUNTER — OFFICE VISIT (OUTPATIENT)
Dept: SURGERY | Facility: CLINIC | Age: 70
End: 2025-04-10
Payer: MEDICARE

## 2025-04-10 VITALS
BODY MASS INDEX: 29 KG/M2 | WEIGHT: 213.81 LBS | SYSTOLIC BLOOD PRESSURE: 105 MMHG | DIASTOLIC BLOOD PRESSURE: 63 MMHG | HEART RATE: 82 BPM

## 2025-04-10 DIAGNOSIS — E66.811 OBESITY (BMI 30.0-34.9): Primary | ICD-10-CM

## 2025-04-10 PROCEDURE — 99213 OFFICE O/P EST LOW 20 MIN: CPT | Mod: ,,, | Performed by: SURGERY

## 2025-04-10 NOTE — PROGRESS NOTES
[facility] - General Surgery Services  [address] [place] [id number]  Phone: [phone]  Fax: [phone]       Progress Note  General and Bariatric Surgery  Dr. Franky Abdalla      Patient Name: Jacob Bruner  YOB: 1955  Author: Franky Abdalla MD     Date: 04/10/2025                   SUBJECTIVE:     Chief Complaint/Reason for Admission:   Chief Complaint   Patient presents with    Follow-up     weight check / refill Rx          History of Present Illness:  Mr. Jacob Bruner is a 69 y.o. male that presents to clinic for follow up of medical weight loss.   Denies any complaints.  No GI issues such as pain, nausea, cramping, GERD, or constipation.    Has been on 7.5mg for last several mos and is happy with results.         Referring provider: No ref. provider found   PCP: No, Primary Doctor     Review of Systems:  12 point ROS negative except as stated in HPI    PAST HISTORY:     Past Medical History:   Diagnosis Date    Arthritis of right hip     CAD (coronary artery disease)     COPD (chronic obstructive pulmonary disease)     GERD (gastroesophageal reflux disease)     High cholesterol     HTN (hypertension)     Obesity     Osteoarthritis of knees, bilateral     Personal history of colonic polyps 12/18/2017    Edward Rodriguez MD    S/p total knee replacement, bilateral     Tobacco user      Past Surgical History:   Procedure Laterality Date    COLONOSCOPY  12/18/2017    Edward Rodriguez MD, polyps, Repeat in 5 years    FUNCTIONAL ENDOSCOPIC SINUS SURGERY (FESS)      TONSILLECTOMY      TOTAL KNEE ARTHROPLASTY Bilateral 06/15/2020    w meniscus repair     No family history on file.  Social History     Socioeconomic History    Marital status:    Tobacco Use    Smoking status: Every Day     Current packs/day: 0.00     Average packs/day: 1.5 packs/day for 30.0 years (45.0 ttl pk-yrs)     Types: Cigarettes, Vaping with nicotine     Start date:       Last attempt to quit: 2019     Years since quittin.2    Smokeless tobacco: Never    Tobacco comments:     Still uses vape but not much     Social Drivers of Health     Financial Resource Strain: Low Risk  (4/3/2025)    Overall Financial Resource Strain (CARDIA)     Difficulty of Paying Living Expenses: Not hard at all   Food Insecurity: No Food Insecurity (4/3/2025)    Hunger Vital Sign     Worried About Running Out of Food in the Last Year: Never true     Ran Out of Food in the Last Year: Never true   Transportation Needs: No Transportation Needs (4/3/2025)    PRAPARE - Transportation     Lack of Transportation (Medical): No     Lack of Transportation (Non-Medical): No   Physical Activity: Insufficiently Active (4/3/2025)    Exercise Vital Sign     Days of Exercise per Week: 2 days     Minutes of Exercise per Session: 10 min   Stress: No Stress Concern Present (4/3/2025)    [employer] - Occupational Stress Questionnaire     Feeling of Stress : Not at all   Housing Stability: Low Risk  (4/3/2025)    Housing Stability Vital Sign     Unable to Pay for Housing in the Last Year: No     Number of Times Moved in the Last Year: 0     Homeless in the Last Year: No       MEDICATIONS & ALLERGIES:     Current Outpatient Medications on File Prior to Visit   Medication Sig    pravastatin (PRAVACHOL) 40 MG tablet Take 40 mg by mouth every evening.    tamsulosin (FLOMAX) 0.4 mg Cap Take 1 capsule by mouth 2 (two) times daily.    tirzepatide, weight loss, (ZEPBOUND) 7.5 mg/0.5 mL PnIj Inject 7.5 mg into the skin every 7 days.     No current facility-administered medications on file prior to visit.     Review of patient's allergies indicates:  No Known Allergies    OBJECTIVE:     Vitals:    04/10/25 0859   BP: 105/63   Pulse: 82   Weight: 97 kg (213 lb 12.8 oz)     Body mass index is 29 kg/m².    Physical Exam:  General:  Well developed, well nourished, no acute distress  HEENT:   Normocephalic, atraumatic, PERRL, EOMI, clear sclera, ears normal, neck supple, throat clear without erythema or exudates  CVS:  RRR, S1 and S2 normal, no murmurs, rubs, gallops  Resp:  Lungs clear to auscultation, no wheezes, rales, rhonchi, cough  GI:  Abdomen soft, non-tender, non-distended, normoactive bowel sounds, no masses  :  Deferred  MSK:  No muscle atrophy, cyanosis, peripheral edema, full range of motion  Skin:  No rashes, ulcers, erythema  Neuro:  CNII-XII grossly intact  Psych:  Alert and oriented to person, place, and time    Results:  I have independently reviewed all pertinent lab and radiologic studies relevant to general surgery.      VISIT DIAGNOSES:   No diagnosis found.    ASSESSMENT/PLAN:     Obesity - resolving    Plan: cont current dosage, personal goal weight 200#  Dr. Abdalla examined patient, discussed recommendations, obtained informed consent, and answered all questions with patient.  Discussed current method of maintenance of GLP 1 and weight loss sustainability.    Franky Abdalla MD      RTC 6 mos

## 2025-04-23 ENCOUNTER — TELEPHONE (OUTPATIENT)
Dept: SURGERY | Facility: CLINIC | Age: 70
End: 2025-04-23
Payer: MEDICARE

## 2025-04-23 DIAGNOSIS — E66.811 OBESITY (BMI 30.0-34.9): ICD-10-CM

## 2025-04-23 RX ORDER — TIRZEPATIDE 7.5 MG/.5ML
7.5 INJECTION, SOLUTION SUBCUTANEOUS
Qty: 4 PEN | Refills: 0 | Status: SHIPPED | OUTPATIENT
Start: 2025-04-23

## 2025-04-23 RX ORDER — TIRZEPATIDE 7.5 MG/.5ML
7.5 INJECTION, SOLUTION SUBCUTANEOUS
Qty: 4 PEN | Refills: 0 | Status: SHIPPED | OUTPATIENT
Start: 2025-04-23 | End: 2025-04-23 | Stop reason: SDUPTHER

## 2025-04-23 NOTE — TELEPHONE ENCOUNTER
Justina can you electronically send this. I tried but can't. Thanks   Annie call and let the pt know. Or email him, that's how we usually contact him

## 2025-04-23 NOTE — TELEPHONE ENCOUNTER
----- Message from Med Assistant Ange sent at 4/10/2025  9:35 AM CDT -----  Regarding: refill med  Reminder to call 2w to refill meds early ( max)

## 2025-05-22 DIAGNOSIS — E66.811 OBESITY (BMI 30.0-34.9): Primary | ICD-10-CM

## 2025-05-22 RX ORDER — TIRZEPATIDE 7.5 MG/.5ML
7.5 INJECTION, SOLUTION SUBCUTANEOUS
Qty: 4 PEN | Refills: 0 | Status: SHIPPED | OUTPATIENT
Start: 2025-05-22

## 2025-06-12 ENCOUNTER — OFFICE VISIT (OUTPATIENT)
Dept: ORTHOPEDICS | Facility: CLINIC | Age: 70
End: 2025-06-12
Payer: MEDICARE

## 2025-06-12 ENCOUNTER — HOSPITAL ENCOUNTER (OUTPATIENT)
Dept: RADIOLOGY | Facility: CLINIC | Age: 70
Discharge: HOME | End: 2025-06-12
Attending: PHYSICIAN ASSISTANT
Payer: MEDICARE

## 2025-06-12 ENCOUNTER — LAB VISIT (OUTPATIENT)
Dept: LAB | Facility: HOSPITAL | Age: 70
End: 2025-06-12
Attending: PHYSICIAN ASSISTANT
Payer: MEDICARE

## 2025-06-12 VITALS
DIASTOLIC BLOOD PRESSURE: 61 MMHG | HEIGHT: 72 IN | SYSTOLIC BLOOD PRESSURE: 92 MMHG | BODY MASS INDEX: 28.97 KG/M2 | HEART RATE: 61 BPM | WEIGHT: 213.88 LBS

## 2025-06-12 DIAGNOSIS — Z96.651 S/P TKR (TOTAL KNEE REPLACEMENT), RIGHT: ICD-10-CM

## 2025-06-12 DIAGNOSIS — M25.562 PAIN IN BOTH KNEES, UNSPECIFIED CHRONICITY: ICD-10-CM

## 2025-06-12 DIAGNOSIS — M25.561 PAIN IN BOTH KNEES, UNSPECIFIED CHRONICITY: ICD-10-CM

## 2025-06-12 DIAGNOSIS — M25.561 RIGHT KNEE PAIN, UNSPECIFIED CHRONICITY: Primary | ICD-10-CM

## 2025-06-12 LAB
BASOPHILS # BLD AUTO: 0.04 X10(3)/MCL
BASOPHILS NFR BLD AUTO: 0.6 %
CRP SERPL-MCNC: 1.4 MG/L
EOSINOPHIL # BLD AUTO: 0.19 X10(3)/MCL (ref 0–0.9)
EOSINOPHIL NFR BLD AUTO: 2.7 %
ERYTHROCYTE [DISTWIDTH] IN BLOOD BY AUTOMATED COUNT: 14.2 % (ref 11.5–17)
ERYTHROCYTE [SEDIMENTATION RATE] IN BLOOD: 4 MM/HR (ref 0–15)
HCT VFR BLD AUTO: 44.8 % (ref 42–52)
HGB BLD-MCNC: 15.3 G/DL (ref 14–18)
IMM GRANULOCYTES # BLD AUTO: 0.01 X10(3)/MCL (ref 0–0.04)
IMM GRANULOCYTES NFR BLD AUTO: 0.1 %
LYMPHOCYTES # BLD AUTO: 1.28 X10(3)/MCL (ref 0.6–4.6)
LYMPHOCYTES NFR BLD AUTO: 18.4 %
MCH RBC QN AUTO: 32.6 PG (ref 27–31)
MCHC RBC AUTO-ENTMCNC: 34.2 G/DL (ref 33–36)
MCV RBC AUTO: 95.3 FL (ref 80–94)
MONOCYTES # BLD AUTO: 0.55 X10(3)/MCL (ref 0.1–1.3)
MONOCYTES NFR BLD AUTO: 7.9 %
NEUTROPHILS # BLD AUTO: 4.87 X10(3)/MCL (ref 2.1–9.2)
NEUTROPHILS NFR BLD AUTO: 70.3 %
NRBC BLD AUTO-RTO: 0 %
PLATELET # BLD AUTO: 216 X10(3)/MCL (ref 130–400)
PMV BLD AUTO: 10.4 FL (ref 7.4–10.4)
RBC # BLD AUTO: 4.7 X10(6)/MCL (ref 4.7–6.1)
WBC # BLD AUTO: 6.94 X10(3)/MCL (ref 4.5–11.5)

## 2025-06-12 PROCEDURE — 99203 OFFICE O/P NEW LOW 30 MIN: CPT | Mod: ,,, | Performed by: PHYSICIAN ASSISTANT

## 2025-06-12 PROCEDURE — 73564 X-RAY EXAM KNEE 4 OR MORE: CPT | Mod: 50,,, | Performed by: PHYSICIAN ASSISTANT

## 2025-06-12 PROCEDURE — 86140 C-REACTIVE PROTEIN: CPT

## 2025-06-12 PROCEDURE — 85025 COMPLETE CBC W/AUTO DIFF WBC: CPT

## 2025-06-12 PROCEDURE — 36415 COLL VENOUS BLD VENIPUNCTURE: CPT

## 2025-06-12 PROCEDURE — 85652 RBC SED RATE AUTOMATED: CPT

## 2025-06-12 RX ORDER — CELECOXIB 200 MG/1
200 CAPSULE ORAL DAILY
Qty: 30 CAPSULE | Refills: 1 | Status: SHIPPED | OUTPATIENT
Start: 2025-06-12

## 2025-06-12 NOTE — PROGRESS NOTES
Chief Complaint:   Chief Complaint   Patient presents with    Knee Pain     R knee - reports fluid in the knee. Reports no pain. Reports stiffness.        History of present illness:    This is a 70 y.o. year old   History of Present Illness    CHIEF COMPLAINT:  - Right knee swelling s/p bilateral total knee arthroplasty    HPI:  Jacob presents for follow-up of bilateral knee replacements performed four years ago. The right knee has developed noticeable fluid accumulation again. He denies pain in the right knee, stating he is aware of its presence but denies discomfort. The left knee also has some fluid but to a lesser extent. He denies redness, heat, or fever associated with the knee swelling.    He has lost approximately 50 lbs since the surgery and feels good overall. He reports increased activity, including walking on marble for five months, and notes he has maintained a high level of activity since the surgery.    He had recent labs in March with Dr. Reynold Gunderson, who has been assisting with weight loss.    He denies hip pain, joint line pain, any recent infections, ingrown toenails, or tooth abscesses. Bilateral total knee arthroplasty: 4 years ago, good results    IMAGING:  - XR Bilateral Knees: Looking fine with no signs of complications    SURGICAL HISTORY:  - Bilateral total knee arthroplasty: 4 years ago  - Right knee surgery: 1972 or 1973, performed by Dr. Barnard, involved removal of ligaments and tendons    WORK STATUS:  - Walking on marble for 5 months  - Maintained high level of activity since surgery  - Increased overall activity          Current Outpatient Medications   Medication Sig    pravastatin (PRAVACHOL) 40 MG tablet Take 40 mg by mouth every evening.    tamsulosin (FLOMAX) 0.4 mg Cap Take 1 capsule by mouth 2 (two) times daily.    tirzepatide, weight loss, (ZEPBOUND) 7.5 mg/0.5 mL PnIj Inject 7.5 mg into the skin every 7 days.     No current facility-administered medications for this visit.        Review of Systems:    Constitution:   Denies chills, fever, and sweats.  HENT:   Denies headaches or blurry vision.  Cardiovascular:  Denies chest pain or irregular heart beat.  Respiratory:   Denies cough or shortness of breath.  Gastrointestinal:  Denies abdominal pain, nausea, or vomiting.  Musculoskeletal:   Denies muscle cramps.  Neurological:   Denies dizziness or focal weakness.  Psychiatric/Behavior: Normal mental status.  Hematology/Lymph:  Denies bleeding problem or easy bruising/bleeding.  Skin:    Denies rash or suspicious lesions.    Examination:    Vital Signs:    Vitals:    06/12/25 0800   BP: 92/61   Pulse: 61   Weight: 97 kg (213 lb 13.5 oz)   Height: 6' (1.829 m)       Body mass index is 29 kg/m².    Constitution:   Well-developed, well nourished patient in no acute distress.  Neurological:   Alert and oriented x 3 and cooperative to examination.     Psychiatric/Behavior: Normal mental status.  Respiratory:   No shortness of breath.  Eyes:    Extraoccular muscles intact  Skin:    No scars, rash or suspicious lesions.    Physical Exam:   Knee exam.    Patient has well-healed surgical incisions.    Left knee has a range of motion from 0-115 degrees   That has no AP instability.    No varus or valgus instability.    No tenderness palpation.    No effusion    Right knee exam.    Patient has a small suprapatellar effusion   no erythema.    Range of motion is 0 from 115°.    No AP instability.    No varus or valgus instability.        Imaging: X-rays ordered and images interpreted today personally by me of bilateral knees four views each knee.    Intact prosthesis in good position with no signs of lucency        Assessment: Right knee pain, unspecified chronicity    Pain in both knees, unspecified chronicity  -     X-Ray Knee Complete 4 Or More Views Bilat; Future; Expected date: 06/12/2025    S/P TKR (total knee replacement), right  -     CBC Auto Differential; Future; Expected date: 06/12/2025  -      C-Reactive Protein; Future; Expected date: 06/12/2025  -     Sedimentation rate; Future; Expected date: 06/12/2025         Plan:    Assessment & Plan    RIGHT KNEE EFFUSION:  - Use ice on the right knee.  - Reduce activity on the right knee.  - Started Celebrex or Mobic for the next couple of weeks.  - Ordered labs including Sed rate, CRP, and CBC to check for inflammatory markers.    FOLLOW-UP:  - Follow up in 1 month.        At this point the patient has she has been doing more activity than normal he is having some increasing swelling in his right knee.    No history of trauma no history of recent infection no pain and no redness but we will get a panel of blood work to make sure that has no type of inflammatory markers going on with the patient.    We will see him back in a month.    The ledge states her session in his today so who we will be able to get off of his feet and rest a little bit more.            This note was generated with the assistance of ambient listening technology. Verbal consent was obtained by the patient and accompanying visitor(s) for the recording of patient appointment to facilitate this note. I attest to having reviewed and edited the generated note for accuracy, though some syntax or spelling errors may persist. Please contact the author of this note for any clarification.       DISCLAIMER: This note may have been dictated using voice recognition software and may contain grammatical errors.     NOTE: Consult report sent to referring provider via [employer].

## 2025-06-23 DIAGNOSIS — E66.811 OBESITY (BMI 30.0-34.9): Primary | ICD-10-CM

## 2025-06-23 RX ORDER — TIRZEPATIDE 7.5 MG/.5ML
7.5 INJECTION, SOLUTION SUBCUTANEOUS
Qty: 4 PEN | Refills: 0 | Status: SHIPPED | OUTPATIENT
Start: 2025-06-23

## 2025-07-10 ENCOUNTER — OFFICE VISIT (OUTPATIENT)
Dept: ORTHOPEDICS | Facility: CLINIC | Age: 70
End: 2025-07-10
Payer: MEDICARE

## 2025-07-10 VITALS
SYSTOLIC BLOOD PRESSURE: 96 MMHG | HEART RATE: 63 BPM | WEIGHT: 206.81 LBS | HEIGHT: 72 IN | DIASTOLIC BLOOD PRESSURE: 56 MMHG | BODY MASS INDEX: 28.01 KG/M2

## 2025-07-10 DIAGNOSIS — Z96.651 S/P TKR (TOTAL KNEE REPLACEMENT), RIGHT: Primary | ICD-10-CM

## 2025-07-10 PROCEDURE — 99213 OFFICE O/P EST LOW 20 MIN: CPT | Mod: ,,, | Performed by: PHYSICIAN ASSISTANT

## 2025-07-10 RX ORDER — CELECOXIB 200 MG/1
200 CAPSULE ORAL DAILY
Qty: 30 CAPSULE | Refills: 2 | Status: SHIPPED | OUTPATIENT
Start: 2025-07-10

## 2025-07-10 NOTE — PROGRESS NOTES
Chief Complaint:   Chief Complaint   Patient presents with    Follow-up     R knee - state it has been doing better. Has noticed relief with the celebrex. Requesting refill.        History of present illness:    This is a 70 y.o. year old   History of Present Illness    CHIEF COMPLAINT:  - Knee swelling    HPI:  Jacob presents for follow-up regarding knee swelling post-surgery. He reports improvement in both his cough and knee swelling after taking prescribed medication. He states that the knee was not causing significant pain, but he could notice the swelling. He is currently taking one pill of the prescribed medication daily. He expresses concern about potentially needing knee drainage again. His knee has always had some puffiness, which was more pronounced before surgery. Knee surgery: Performed in the past, resulted in post-surgical swelling which has improved with medication    SURGICAL HISTORY:  - Knee surgery: Date not specified          Current Outpatient Medications   Medication Sig    celecoxib (CELEBREX) 200 MG capsule Take 1 capsule (200 mg total) by mouth once daily.    pravastatin (PRAVACHOL) 40 MG tablet Take 40 mg by mouth every evening.    tamsulosin (FLOMAX) 0.4 mg Cap Take 1 capsule by mouth 2 (two) times daily.    tirzepatide, weight loss, (ZEPBOUND) 7.5 mg/0.5 mL PnIj Inject 7.5 mg into the skin every 7 days.    celecoxib (CELEBREX) 200 MG capsule Take 1 capsule (200 mg total) by mouth once daily.     No current facility-administered medications for this visit.       Review of Systems:    Constitution:   Denies chills, fever, and sweats.  HENT:   Denies headaches or blurry vision.  Cardiovascular:  Denies chest pain or irregular heart beat.  Respiratory:   Denies cough or shortness of breath.  Gastrointestinal:  Denies abdominal pain, nausea, or vomiting.  Musculoskeletal:   Denies muscle cramps.  Neurological:   Denies dizziness or focal weakness.  Psychiatric/Behavior: Normal mental  status.  Hematology/Lymph:  Denies bleeding problem or easy bruising/bleeding.  Skin:    Denies rash or suspicious lesions.    Examination:    Vital Signs:    Vitals:    07/10/25 0842   BP: (!) 96/56   Pulse: 63   Weight: 93.8 kg (206 lb 12.8 oz)   Height: 6' (1.829 m)       Body mass index is 28.05 kg/m².    Constitution:   Well-developed, well nourished patient in no acute distress.  Neurological:   Alert and oriented x 3 and cooperative to examination.     Psychiatric/Behavior: Normal mental status.  Respiratory:   No shortness of breath.  Eyes:    Extraoccular muscles intact  Skin:    No scars, rash or suspicious lesions.    Physical Exam:   Right knee exam.    Effusion has significantly reduced an almost resolved completely  no erythema.    Range of motion is 0 from 115°.    No AP instability.    No varus or valgus instability.       Assessment: S/P TKR (total knee replacement), right    Other orders  -     celecoxib (CELEBREX) 200 MG capsule; Take 1 capsule (200 mg total) by mouth once daily.  Dispense: 30 capsule; Refill: 2         Plan:    Assessment & Plan    RIGHT KNEE EFFUSION:  - Discussed not draining the knee unless looking for something growing in there.  - Continue current treatment for another month.  - Discontinue medication after a month if symptoms remain improved.  - Contact Dr. Bojorquez if swelling recurs or discomfort increases after stopping medication.      The patient has lab work including CBC sed rate and CRP were all negative and within normal range           This note was generated with the assistance of ambient listening technology. Verbal consent was obtained by the patient and accompanying visitor(s) for the recording of patient appointment to facilitate this note. I attest to having reviewed and edited the generated note for accuracy, though some syntax or spelling errors may persist. Please contact the author of this note for any clarification.       DISCLAIMER: This note may have  been dictated using voice recognition software and may contain grammatical errors.     NOTE: Consult report sent to referring provider via [employer].

## 2025-08-15 DIAGNOSIS — E66.811 OBESITY (BMI 30.0-34.9): Primary | ICD-10-CM

## 2025-08-15 RX ORDER — TIRZEPATIDE 7.5 MG/.5ML
5 INJECTION, SOLUTION SUBCUTANEOUS
Qty: 4 PEN | Refills: 0 | Status: SHIPPED | OUTPATIENT
Start: 2025-08-15

## 2025-08-25 DIAGNOSIS — E66.811 OBESITY (BMI 30.0-34.9): ICD-10-CM
